# Patient Record
Sex: FEMALE | Race: BLACK OR AFRICAN AMERICAN | Employment: OTHER | URBAN - METROPOLITAN AREA
[De-identification: names, ages, dates, MRNs, and addresses within clinical notes are randomized per-mention and may not be internally consistent; named-entity substitution may affect disease eponyms.]

---

## 2018-01-19 PROBLEM — I21.4 NSTEMI (NON-ST ELEVATED MYOCARDIAL INFARCTION) (HCC): Status: ACTIVE | Noted: 2018-01-19

## 2018-04-20 PROBLEM — I24.9 ACUTE CORONARY SYNDROME (HCC): Status: ACTIVE | Noted: 2018-04-20

## 2018-09-10 PROBLEM — I20.8 ANGINAL CHEST PAIN AT REST (HCC): Status: ACTIVE | Noted: 2018-09-10

## 2018-09-10 PROBLEM — Z95.1 HX OF CABG: Status: ACTIVE | Noted: 2018-02-20

## 2018-09-10 PROBLEM — K92.1 MELENA: Status: ACTIVE | Noted: 2018-09-10

## 2018-09-10 PROBLEM — D64.9 ANEMIA: Status: ACTIVE | Noted: 2018-09-10

## 2018-09-10 PROBLEM — E11.42 TYPE 2 DIABETES MELLITUS WITH DIABETIC POLYNEUROPATHY, WITHOUT LONG-TERM CURRENT USE OF INSULIN (HCC): Status: ACTIVE | Noted: 2018-02-20

## 2018-09-10 PROBLEM — E11.65 HYPERGLYCEMIA DUE TO TYPE 2 DIABETES MELLITUS (HCC): Status: ACTIVE | Noted: 2018-09-10

## 2018-09-13 PROBLEM — J96.90 RESPIRATORY FAILURE (HCC): Status: ACTIVE | Noted: 2018-09-13

## 2018-09-13 PROBLEM — J18.9 HOSPITAL-ACQUIRED PNEUMONIA: Status: ACTIVE | Noted: 2018-09-13
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
## 2019-02-21 PROBLEM — Z86.79 HX OF CORONARY ARTERY DISEASE: Status: ACTIVE | Noted: 2019-02-21

## 2019-02-21 PROBLEM — I20.0 UNSTABLE ANGINA (HCC): Status: ACTIVE | Noted: 2019-02-21

## 2019-03-11 PROBLEM — R04.2 HEMOPTYSIS: Status: ACTIVE | Noted: 2019-03-11

## 2019-03-11 PROBLEM — J18.9 HCAP (HEALTHCARE-ASSOCIATED PNEUMONIA): Status: ACTIVE | Noted: 2019-03-11

## 2019-03-26 PROBLEM — I50.23 ACUTE ON CHRONIC SYSTOLIC HEART FAILURE (HCC): Status: ACTIVE | Noted: 2019-03-26

## 2019-08-20 ENCOUNTER — HOSPITAL ENCOUNTER (OUTPATIENT)
Age: 69
Setting detail: OBSERVATION
LOS: 1 days | Discharge: HOME HEALTH | End: 2019-08-21
Attending: EMERGENCY MEDICINE | Admitting: INTERNAL MEDICINE
Payer: MEDICARE

## 2019-08-20 ENCOUNTER — APPOINTMENT (OUTPATIENT)
Dept: GENERAL RADIOLOGY | Age: 69
End: 2019-08-20
Attending: EMERGENCY MEDICINE
Payer: MEDICARE

## 2019-08-20 DIAGNOSIS — R07.9 ACUTE CHEST PAIN: Primary | ICD-10-CM

## 2019-08-20 LAB
ALBUMIN SERPL-MCNC: 3.7 G/DL (ref 3.4–5)
ALBUMIN/GLOB SERPL: 1 {RATIO} (ref 0.8–1.7)
ALP SERPL-CCNC: 117 U/L (ref 45–117)
ALT SERPL-CCNC: 24 U/L (ref 13–56)
ANION GAP SERPL CALC-SCNC: 9 MMOL/L (ref 3–18)
AST SERPL-CCNC: 46 U/L (ref 10–38)
BASOPHILS # BLD: 0 K/UL (ref 0–0.1)
BASOPHILS NFR BLD: 0 % (ref 0–2)
BILIRUB SERPL-MCNC: 0.6 MG/DL (ref 0.2–1)
BNP SERPL-MCNC: 2113 PG/ML (ref 0–900)
BUN SERPL-MCNC: 14 MG/DL (ref 7–18)
BUN/CREAT SERPL: 13 (ref 12–20)
CALCIUM SERPL-MCNC: 9 MG/DL (ref 8.5–10.1)
CHLORIDE SERPL-SCNC: 105 MMOL/L (ref 100–111)
CO2 SERPL-SCNC: 27 MMOL/L (ref 21–32)
CREAT SERPL-MCNC: 1.04 MG/DL (ref 0.6–1.3)
DIFFERENTIAL METHOD BLD: ABNORMAL
EOSINOPHIL # BLD: 0.1 K/UL (ref 0–0.4)
EOSINOPHIL NFR BLD: 1 % (ref 0–5)
ERYTHROCYTE [DISTWIDTH] IN BLOOD BY AUTOMATED COUNT: 14.2 % (ref 11.6–14.5)
GLOBULIN SER CALC-MCNC: 3.6 G/DL (ref 2–4)
GLUCOSE SERPL-MCNC: 217 MG/DL (ref 74–99)
HCT VFR BLD AUTO: 38.7 % (ref 35–45)
HGB BLD-MCNC: 13 G/DL (ref 12–16)
LYMPHOCYTES # BLD: 1.4 K/UL (ref 0.9–3.6)
LYMPHOCYTES NFR BLD: 12 % (ref 21–52)
MCH RBC QN AUTO: 29.4 PG (ref 24–34)
MCHC RBC AUTO-ENTMCNC: 33.6 G/DL (ref 31–37)
MCV RBC AUTO: 87.6 FL (ref 74–97)
MONOCYTES # BLD: 0.4 K/UL (ref 0.05–1.2)
MONOCYTES NFR BLD: 4 % (ref 3–10)
NEUTS SEG # BLD: 9.2 K/UL (ref 1.8–8)
NEUTS SEG NFR BLD: 83 % (ref 40–73)
PLATELET # BLD AUTO: 218 K/UL (ref 135–420)
PMV BLD AUTO: 11 FL (ref 9.2–11.8)
POTASSIUM SERPL-SCNC: 5 MMOL/L (ref 3.5–5.5)
PROT SERPL-MCNC: 7.3 G/DL (ref 6.4–8.2)
RBC # BLD AUTO: 4.42 M/UL (ref 4.2–5.3)
SODIUM SERPL-SCNC: 141 MMOL/L (ref 136–145)
TROPONIN I SERPL-MCNC: <0.02 NG/ML (ref 0–0.04)
TROPONIN I SERPL-MCNC: <0.02 NG/ML (ref 0–0.04)
WBC # BLD AUTO: 11.1 K/UL (ref 4.6–13.2)

## 2019-08-20 PROCEDURE — 74011250637 HC RX REV CODE- 250/637: Performed by: EMERGENCY MEDICINE

## 2019-08-20 PROCEDURE — 74011250636 HC RX REV CODE- 250/636: Performed by: EMERGENCY MEDICINE

## 2019-08-20 PROCEDURE — 71046 X-RAY EXAM CHEST 2 VIEWS: CPT

## 2019-08-20 PROCEDURE — 85025 COMPLETE CBC W/AUTO DIFF WBC: CPT

## 2019-08-20 PROCEDURE — 80053 COMPREHEN METABOLIC PANEL: CPT

## 2019-08-20 PROCEDURE — 99218 HC RM OBSERVATION: CPT

## 2019-08-20 PROCEDURE — 93005 ELECTROCARDIOGRAM TRACING: CPT

## 2019-08-20 PROCEDURE — 84484 ASSAY OF TROPONIN QUANT: CPT

## 2019-08-20 PROCEDURE — 65270000029 HC RM PRIVATE

## 2019-08-20 PROCEDURE — 96374 THER/PROPH/DIAG INJ IV PUSH: CPT

## 2019-08-20 PROCEDURE — 83880 ASSAY OF NATRIURETIC PEPTIDE: CPT

## 2019-08-20 PROCEDURE — 99285 EMERGENCY DEPT VISIT HI MDM: CPT

## 2019-08-20 RX ORDER — LEVOTHYROXINE SODIUM 88 UG/1
88 TABLET ORAL
Status: DISCONTINUED | OUTPATIENT
Start: 2019-08-21 | End: 2019-08-21 | Stop reason: HOSPADM

## 2019-08-20 RX ORDER — POTASSIUM CHLORIDE 750 MG/1
10 TABLET, EXTENDED RELEASE ORAL DAILY
Status: DISCONTINUED | OUTPATIENT
Start: 2019-08-21 | End: 2019-08-21 | Stop reason: HOSPADM

## 2019-08-20 RX ORDER — FOLIC ACID 1 MG/1
1 TABLET ORAL DAILY
Status: DISCONTINUED | OUTPATIENT
Start: 2019-08-21 | End: 2019-08-21 | Stop reason: HOSPADM

## 2019-08-20 RX ORDER — DOCUSATE SODIUM 100 MG/1
100 CAPSULE, LIQUID FILLED ORAL
Status: DISCONTINUED | OUTPATIENT
Start: 2019-08-20 | End: 2019-08-21 | Stop reason: HOSPADM

## 2019-08-20 RX ORDER — FUROSEMIDE 40 MG/1
40 TABLET ORAL 2 TIMES DAILY
Status: DISCONTINUED | OUTPATIENT
Start: 2019-08-21 | End: 2019-08-21 | Stop reason: HOSPADM

## 2019-08-20 RX ORDER — LANOLIN ALCOHOL/MO/W.PET/CERES
325 CREAM (GRAM) TOPICAL
Status: DISCONTINUED | OUTPATIENT
Start: 2019-08-21 | End: 2019-08-21 | Stop reason: HOSPADM

## 2019-08-20 RX ORDER — GABAPENTIN 400 MG/1
400 CAPSULE ORAL 3 TIMES DAILY
Status: DISCONTINUED | OUTPATIENT
Start: 2019-08-21 | End: 2019-08-21 | Stop reason: HOSPADM

## 2019-08-20 RX ORDER — ISOSORBIDE MONONITRATE 60 MG/1
120 TABLET, EXTENDED RELEASE ORAL
Status: DISCONTINUED | OUTPATIENT
Start: 2019-08-21 | End: 2019-08-21 | Stop reason: HOSPADM

## 2019-08-20 RX ORDER — FUROSEMIDE 10 MG/ML
40 INJECTION INTRAMUSCULAR; INTRAVENOUS ONCE
Status: COMPLETED | OUTPATIENT
Start: 2019-08-20 | End: 2019-08-20

## 2019-08-20 RX ORDER — ATORVASTATIN CALCIUM 40 MG/1
80 TABLET, FILM COATED ORAL DAILY
Status: DISCONTINUED | OUTPATIENT
Start: 2019-08-21 | End: 2019-08-21 | Stop reason: HOSPADM

## 2019-08-20 RX ORDER — GUAIFENESIN 100 MG/5ML
81 LIQUID (ML) ORAL DAILY
Status: DISCONTINUED | OUTPATIENT
Start: 2019-08-21 | End: 2019-08-21 | Stop reason: HOSPADM

## 2019-08-20 RX ORDER — NALOXONE HYDROCHLORIDE 0.4 MG/ML
0.4 INJECTION, SOLUTION INTRAMUSCULAR; INTRAVENOUS; SUBCUTANEOUS AS NEEDED
Status: DISCONTINUED | OUTPATIENT
Start: 2019-08-20 | End: 2019-08-21 | Stop reason: HOSPADM

## 2019-08-20 RX ORDER — RANOLAZINE 500 MG/1
1000 TABLET, EXTENDED RELEASE ORAL EVERY 12 HOURS
Status: DISCONTINUED | OUTPATIENT
Start: 2019-08-21 | End: 2019-08-21 | Stop reason: HOSPADM

## 2019-08-20 RX ORDER — PANTOPRAZOLE SODIUM 40 MG/1
40 TABLET, DELAYED RELEASE ORAL
Status: DISCONTINUED | OUTPATIENT
Start: 2019-08-21 | End: 2019-08-21 | Stop reason: HOSPADM

## 2019-08-20 RX ORDER — MAGNESIUM SULFATE 100 %
4 CRYSTALS MISCELLANEOUS AS NEEDED
Status: DISCONTINUED | OUTPATIENT
Start: 2019-08-20 | End: 2019-08-21 | Stop reason: HOSPADM

## 2019-08-20 RX ORDER — NITROGLYCERIN 0.4 MG/1
0.4 TABLET SUBLINGUAL AS NEEDED
Status: DISCONTINUED | OUTPATIENT
Start: 2019-08-20 | End: 2019-08-21 | Stop reason: HOSPADM

## 2019-08-20 RX ORDER — GUAIFENESIN 100 MG/5ML
243 LIQUID (ML) ORAL
Status: COMPLETED | OUTPATIENT
Start: 2019-08-20 | End: 2019-08-20

## 2019-08-20 RX ORDER — GUAIFENESIN 100 MG/5ML
100 SOLUTION ORAL
Status: DISCONTINUED | OUTPATIENT
Start: 2019-08-20 | End: 2019-08-21 | Stop reason: HOSPADM

## 2019-08-20 RX ORDER — ONDANSETRON 2 MG/ML
4 INJECTION INTRAMUSCULAR; INTRAVENOUS
Status: DISCONTINUED | OUTPATIENT
Start: 2019-08-20 | End: 2019-08-21 | Stop reason: HOSPADM

## 2019-08-20 RX ORDER — ALLOPURINOL 100 MG/1
100 TABLET ORAL DAILY
Status: DISCONTINUED | OUTPATIENT
Start: 2019-08-21 | End: 2019-08-21 | Stop reason: HOSPADM

## 2019-08-20 RX ORDER — IPRATROPIUM BROMIDE AND ALBUTEROL SULFATE 2.5; .5 MG/3ML; MG/3ML
3 SOLUTION RESPIRATORY (INHALATION)
Status: DISCONTINUED | OUTPATIENT
Start: 2019-08-20 | End: 2019-08-21 | Stop reason: HOSPADM

## 2019-08-20 RX ORDER — FLUTICASONE FUROATE AND VILANTEROL 100; 25 UG/1; UG/1
1 POWDER RESPIRATORY (INHALATION)
Status: DISCONTINUED | OUTPATIENT
Start: 2019-08-21 | End: 2019-08-21 | Stop reason: HOSPADM

## 2019-08-20 RX ORDER — INSULIN LISPRO 100 [IU]/ML
INJECTION, SOLUTION INTRAVENOUS; SUBCUTANEOUS EVERY 6 HOURS
Status: DISCONTINUED | OUTPATIENT
Start: 2019-08-21 | End: 2019-08-21 | Stop reason: HOSPADM

## 2019-08-20 RX ORDER — DULOXETIN HYDROCHLORIDE 60 MG/1
60 CAPSULE, DELAYED RELEASE ORAL DAILY
Status: DISCONTINUED | OUTPATIENT
Start: 2019-08-21 | End: 2019-08-21 | Stop reason: HOSPADM

## 2019-08-20 RX ORDER — ACETAMINOPHEN 500 MG
500 TABLET ORAL
Status: COMPLETED | OUTPATIENT
Start: 2019-08-20 | End: 2019-08-20

## 2019-08-20 RX ORDER — CARVEDILOL 6.25 MG/1
6.25 TABLET ORAL 2 TIMES DAILY WITH MEALS
Status: DISCONTINUED | OUTPATIENT
Start: 2019-08-21 | End: 2019-08-21 | Stop reason: HOSPADM

## 2019-08-20 RX ORDER — DEXTROSE MONOHYDRATE 100 MG/ML
125-250 INJECTION, SOLUTION INTRAVENOUS AS NEEDED
Status: DISCONTINUED | OUTPATIENT
Start: 2019-08-20 | End: 2019-08-21 | Stop reason: HOSPADM

## 2019-08-20 RX ORDER — LOSARTAN POTASSIUM 50 MG/1
50 TABLET ORAL DAILY
Status: DISCONTINUED | OUTPATIENT
Start: 2019-08-21 | End: 2019-08-21 | Stop reason: HOSPADM

## 2019-08-20 RX ORDER — CLOPIDOGREL BISULFATE 75 MG/1
75 TABLET ORAL DAILY
Status: DISCONTINUED | OUTPATIENT
Start: 2019-08-21 | End: 2019-08-21 | Stop reason: HOSPADM

## 2019-08-20 RX ADMIN — ACETAMINOPHEN 500 MG: 500 TABLET ORAL at 20:12

## 2019-08-20 RX ADMIN — FUROSEMIDE 40 MG: 10 INJECTION, SOLUTION INTRAVENOUS at 21:57

## 2019-08-20 RX ADMIN — ASPIRIN 81 MG 243 MG: 81 TABLET ORAL at 19:06

## 2019-08-20 NOTE — ED PROVIDER NOTES
EMERGENCY DEPARTMENT HISTORY AND PHYSICAL EXAM    6:24 PM  Date: 8/20/2019  Patient Name: Wisam De Leon    History of Presenting Illness     Chief Complaint   Patient presents with    Chest Pain       History Provided By: patient    HPI: Wisam De Leon is a 79 y.o. female with CAD, multivessel stents, hypertension, diabetes, anemia presents with chest pain that started this morning. Pain severity is 4 out of 10, radiating to her left arm and left side of her face.   Pain feels achy and pressure, no nausea no vomiting no diaphoresis no difficulty breathing    Location: L Sided  Severity:4/10  Timing/course:intermittent  Onset/Duration:24 hrs     PCP: Ciarra Pappas MD    Past History     Past Medical History:  Past Medical History:   Diagnosis Date    Adenomatous polyp of colon 05/18/12    Alopecia, unspecified 02/14/08    Anemia     Asthma     Asthma     Blood transfusion abn reaction or complication, no procedure mishap     Cataracts, bilateral     Chest pain     Cholecystitis     Chronic back pain     Colorectal polyps     Constipation     Coronary stent occlusion 05/26/10    Cystic kidney disease 2008    Depression     Diabetes mellitus     Diabetes mellitus ([address]     Diarrhea     Emphysema     Exposure to communicable disease     Gastrointestinal ulcer     Gout     H/O seasonal allergies     Headache(784.0)     Heart disease     Heart murmur     Heartburn     History of unstable angina 02/17/12    HSV (herpes simplex virus) infection 2010    Hypercholesteremia     Hypertension     Hypokalemia 04/04/12    Kidney stone     Muscle atrophy     OA (osteoarthritis of spine)     Phlebitis     Pregnancy     Pulmonary hypertension (HCC)     Reflux     Renal artery stenosis (HCC)     Renal insufficiency     Rheumatic fever     Rheumatoid arthritis(714.0)     Sleep apnea 2007    Stomach ulcer     Systemic lupus erythematosus (HCC)     Thyroid dysfunction     Tuberculosis of lung     Unspecified constipation 02/14/08    Unspecified hypothyroidism 07/01/10       Past Surgical History:  Past Surgical History:   Procedure Laterality Date    CARDIAC CATHETERIZATION  1/19/2018         HX CHOLECYSTECTOMY      HX CORONARY ARTERY BYPASS GRAFT      HX CORONARY STENT PLACEMENT  2009    HX CORONARY STENT PLACEMENT  5/26/10    HX DILATION AND CURETTAGE  2008    HX ENDOSCOPY      HX HEART CATHETERIZATION  5/26/10    stent July 2019    HX HERNIA REPAIR      HX TONSIL AND ADENOIDECTOMY         Family History:  Family History   Problem Relation Age of Onset    Ovarian Cancer Sister        Social History:  Social History     Tobacco Use    Smoking status: Former Smoker    Smokeless tobacco: Never Used   Substance Use Topics    Alcohol use: No    Drug use: No       Allergies: Allergies   Allergen Reactions    Latex Other (comments)    Propoxyphene N-Acetaminophen Other (comments)    Seafood [Iodine And Iodide Containing Products] Hives and Swelling    Shellfish Derived Other (comments)    Ace Inhibitors Other (comments)     Lotensin    Adhesive Other (comments)    Dexamethasone Diarrhea and Nausea and Vomiting    Iodine Other (comments)    Levofloxacin Hives, Itching and Swelling     Trouble breathing    Metronidazole Other (comments)    Niaspan [Niacin] Other (comments)     flushing    Reglan [Metoclopramide] Other (comments)       Review of Systems   Review of Systems   Constitutional: Negative for activity change, appetite change and chills. HENT: Negative for congestion, ear discharge, ear pain and sore throat. Eyes: Negative for photophobia and pain. Respiratory: Negative for cough and choking. Cardiovascular: Positive for chest pain. Negative for palpitations and leg swelling. Gastrointestinal: Negative for anal bleeding and rectal pain. Endocrine: Negative for polydipsia and polyuria.    Genitourinary: Negative for genital sores and urgency. Musculoskeletal: Negative for arthralgias and myalgias. Neurological: Negative for dizziness, seizures and speech difficulty. Psychiatric/Behavioral: Negative for hallucinations, self-injury and suicidal ideas. Physical Exam     Patient Vitals for the past 12 hrs:   Pulse Resp BP SpO2   08/20/19 1800 69 16 111/70 100 %       Physical Exam   Constitutional: She is oriented to person, place, and time. She appears well-developed and well-nourished. HENT:   Head: Normocephalic and atraumatic. Eyes: Right eye exhibits no discharge. Left eye exhibits no discharge. Neck: Normal range of motion. Neck supple. Cardiovascular: Normal rate, regular rhythm, normal heart sounds and intact distal pulses. No murmur heard. Pulmonary/Chest: Effort normal and breath sounds normal. No stridor. No respiratory distress. She has no wheezes. She has no rales. She exhibits no tenderness. Abdominal: Soft. Bowel sounds are normal. She exhibits no distension. There is no tenderness. There is no rebound and no guarding. Genitourinary: Rectal exam shows guaiac negative stool. No penile tenderness. Musculoskeletal: Normal range of motion. Neurological: She is alert and oriented to person, place, and time. She has normal reflexes. Skin: Skin is warm and dry. Psychiatric: She has a normal mood and affect. Nursing note and vitals reviewed.       Diagnostic Study Results     Labs -  Recent Results (from the past 12 hour(s))   EKG, 12 LEAD, INITIAL    Collection Time: 08/20/19  6:01 PM   Result Value Ref Range    Ventricular Rate 66 BPM    Atrial Rate 66 BPM    P-R Interval 188 ms    QRS Duration 88 ms    Q-T Interval 422 ms    QTC Calculation (Bezet) 442 ms    Calculated P Axis 72 degrees    Calculated R Axis -36 degrees    Calculated T Axis 148 degrees    Diagnosis       Normal sinus rhythm  Left axis deviation  Left ventricular hypertrophy with repolarization abnormality  Abnormal ECG  No previous ECGs available     CBC WITH AUTOMATED DIFF    Collection Time: 08/20/19  6:40 PM   Result Value Ref Range    WBC 11.1 4.6 - 13.2 K/uL    RBC 4.42 4.20 - 5.30 M/uL    HGB 13.0 12.0 - 16.0 g/dL    HCT 38.7 35.0 - 45.0 %    MCV 87.6 74.0 - 97.0 FL    MCH 29.4 24.0 - 34.0 PG    MCHC 33.6 31.0 - 37.0 g/dL    RDW 14.2 11.6 - 14.5 %    PLATELET 910 805 - 701 K/uL    MPV 11.0 9.2 - 11.8 FL    NEUTROPHILS 83 (H) 40 - 73 %    LYMPHOCYTES 12 (L) 21 - 52 %    MONOCYTES 4 3 - 10 %    EOSINOPHILS 1 0 - 5 %    BASOPHILS 0 0 - 2 %    ABS. NEUTROPHILS 9.2 (H) 1.8 - 8.0 K/UL    ABS. LYMPHOCYTES 1.4 0.9 - 3.6 K/UL    ABS. MONOCYTES 0.4 0.05 - 1.2 K/UL    ABS. EOSINOPHILS 0.1 0.0 - 0.4 K/UL    ABS. BASOPHILS 0.0 0.0 - 0.1 K/UL    DF AUTOMATED     METABOLIC PANEL, COMPREHENSIVE    Collection Time: 08/20/19  6:40 PM   Result Value Ref Range    Sodium 141 136 - 145 mmol/L    Potassium 5.0 3.5 - 5.5 mmol/L    Chloride 105 100 - 111 mmol/L    CO2 27 21 - 32 mmol/L    Anion gap 9 3.0 - 18 mmol/L    Glucose 217 (H) 74 - 99 mg/dL    BUN 14 7.0 - 18 MG/DL    Creatinine 1.04 0.6 - 1.3 MG/DL    BUN/Creatinine ratio 13 12 - 20      GFR est AA >60 >60 ml/min/1.73m2    GFR est non-AA 53 (L) >60 ml/min/1.73m2    Calcium 9.0 8.5 - 10.1 MG/DL    Bilirubin, total 0.6 0.2 - 1.0 MG/DL    ALT (SGPT) 24 13 - 56 U/L    AST (SGOT) 46 (H) 10 - 38 U/L    Alk. phosphatase 117 45 - 117 U/L    Protein, total 7.3 6.4 - 8.2 g/dL    Albumin 3.7 3.4 - 5.0 g/dL    Globulin 3.6 2.0 - 4.0 g/dL    A-G Ratio 1.0 0.8 - 1.7     TROPONIN I    Collection Time: 08/20/19  6:40 PM   Result Value Ref Range    Troponin-I, QT <0.02 0.0 - 0.045 NG/ML   NT-PRO BNP    Collection Time: 08/20/19  6:40 PM   Result Value Ref Range    NT pro-BNP 2,113 (H) 0 - 900 PG/ML       Radiologic Studies -   Xr Chest Pa Lat    Result Date: 8/20/2019  IMPRESSION: Negative chest.        Medical Decision Making     ED Course: Progress Notes, Reevaluation, and Consults:    6:24 PM Initial assessment performed. The patients presenting problems have been discussed, and they/their family are in agreement with the care plan formulated and outlined with them. I have encouraged them to ask questions as they arise throughout their visit. Provider Notes (Medical Decision Making):      Patient with multiple risk factors, heart score: 6 , patient given aspirin. Pain has now subsided but still feels dull ache on left arm. Presents with chest pain-suspicious story  Spoke to [facility] who said that Dr. aCri Dunbar will f/u patient  EKG with ST wave inversions in inferior leads- not present in March 2019  Fist troponin negative  Patient will be admitted to telemetry for ACS r/o  Patient initially wanted to be transferred at [place]- transfer center called, no beds there. Had lengthy discussion with family they decided they prefer to be admitted to our hospital.    Vital Signs-Reviewed the patient's vital signs. Reviewed pt's pulse ox reading. EKG: Interpreted by the EP. Time Interpreted: 6:03 PM   Rate: 66   Rhythm: NSR   Interpretation:T wave in version in lateral leads, new   Comparison: 3/26/19    Records Reviewed: Old Medical Records (Time of Review: 6:24 PM)  -I am the first provider for this patient.  -I reviewed the vital signs, available nursing notes, past medical history, past surgical history, family history and social history. Current Outpatient Medications   Medication Sig Dispense Refill    nystatin (MYCOSTATIN) powder Apply  to affected area two (2) times a day. 60 g 2    furosemide (LASIX) 40 mg tablet Take 1 Tab by mouth two (2) times a day. At 8am and 2pm 30 Tab 1    DULoxetine (CYMBALTA) 60 mg capsule Take 1 Cap by mouth daily. 30 Cap 0    carvedilol (COREG) 6.25 mg tablet Take 1 Tab by mouth two (2) times daily (with meals). 60 Tab 0    famotidine (PEPCID) 40 mg tablet Take 1 Tab by mouth daily.  30 Tab 0    fluticasone furoate-vilanterol (BREO ELLIPTA) 100-25 mcg/dose inhaler Take 1 Puff by inhalation daily. 1 Inhaler 0    guaiFENesin (ROBITUSSIN) 100 mg/5 mL liquid Take 5 mL by mouth every four (4) hours as needed for Cough. 118 mL 0    insulin NPH/insulin regular (HUMULIN 70/30 U-100 INSULIN) 100 unit/mL (70-30) injection 10-15 Units by SubCUTAneous route two (2) times a day.  ranolazine ER (RANEXA) 1,000 mg Take 1 Tab by mouth two (2) times a day. 60 Tab 1    potassium chloride SR (KLOR-CON 10) 10 mEq tablet Take 10 mEq by mouth daily.  glucose blood VI test strips (ASCENSIA AUTODISC VI, ONE TOUCH ULTRA TEST VI) strip by Does Not Apply route See Admin Instructions.  ferrous sulfate 325 mg (65 mg iron) tablet Take 325 mg by mouth Daily (before breakfast).  ascorbic acid, vitamin C, (VITAMIN C) 500 mg tablet Take 500 mg by mouth daily.  levothyroxine (SYNTHROID) 88 mcg tablet Take 88 mcg by mouth Daily (before breakfast).  losartan (COZAAR) 50 mg tablet Take 1 Tab by mouth daily. 30 Tab 1    clopidogrel (PLAVIX) 75 mg tab Take 75 mg by mouth daily.  gabapentin (NEURONTIN) 600 mg tablet Take 600 mg by mouth three (3) times daily.  nitroglycerin (NITROSTAT) 0.4 mg SL tablet 1 Tab by SubLINGual route as needed for Chest Pain (x3). 1 Bottle 1    allopurinol (ZYLOPRIM) 100 mg tablet Take 100 mg by mouth daily.  glimepiride (AMARYL) 4 mg tablet Take  by mouth two (2) times a day.  aspirin 81 mg tablet Take 81 mg by mouth daily.  atorvastatin (LIPITOR) 80 mg tablet Take 80 mg by mouth daily.  folic acid (FOLVITE) 1 mg tablet Take 1 mg by mouth daily.  isosorbide mononitrate ER (IMDUR) 120 mg CR tablet Take 120 mg by mouth every morning.  albuterol (PROVENTIL HFA, VENTOLIN HFA, PROAIR HFA) 90 mcg/actuation inhaler Take 2 Puffs by inhalation every four (4) hours as needed for Wheezing. 1 Inhaler 0    lactobacillus acidophilus (BACID) cap Take 2 Caps by mouth daily. Clinical Impression     Clinical Impression:   1.  Acute chest pain Disposition: Christ Treadwell MD  6:24 PM

## 2019-08-21 VITALS
RESPIRATION RATE: 20 BRPM | BODY MASS INDEX: 31.41 KG/M2 | DIASTOLIC BLOOD PRESSURE: 70 MMHG | HEART RATE: 83 BPM | OXYGEN SATURATION: 98 % | HEIGHT: 60 IN | WEIGHT: 160 LBS | TEMPERATURE: 98.8 F | SYSTOLIC BLOOD PRESSURE: 104 MMHG

## 2019-08-21 LAB
ANION GAP SERPL CALC-SCNC: 5 MMOL/L (ref 3–18)
BASOPHILS # BLD: 0 K/UL (ref 0–0.1)
BASOPHILS NFR BLD: 0 % (ref 0–2)
BUN SERPL-MCNC: 15 MG/DL (ref 7–18)
BUN/CREAT SERPL: 14 (ref 12–20)
CALCIUM SERPL-MCNC: 8.9 MG/DL (ref 8.5–10.1)
CHLORIDE SERPL-SCNC: 103 MMOL/L (ref 100–111)
CK MB CFR SERPL CALC: 1.7 % (ref 0–4)
CK MB SERPL-MCNC: 1.6 NG/ML (ref 5–25)
CK SERPL-CCNC: 93 U/L (ref 26–192)
CO2 SERPL-SCNC: 31 MMOL/L (ref 21–32)
CREAT SERPL-MCNC: 1.1 MG/DL (ref 0.6–1.3)
DIFFERENTIAL METHOD BLD: NORMAL
EOSINOPHIL # BLD: 0.1 K/UL (ref 0–0.4)
EOSINOPHIL NFR BLD: 1 % (ref 0–5)
ERYTHROCYTE [DISTWIDTH] IN BLOOD BY AUTOMATED COUNT: 14.4 % (ref 11.6–14.5)
GLUCOSE BLD STRIP.AUTO-MCNC: 119 MG/DL (ref 70–110)
GLUCOSE BLD STRIP.AUTO-MCNC: 138 MG/DL (ref 70–110)
GLUCOSE BLD STRIP.AUTO-MCNC: 185 MG/DL (ref 70–110)
GLUCOSE BLD STRIP.AUTO-MCNC: 214 MG/DL (ref 70–110)
GLUCOSE BLD STRIP.AUTO-MCNC: 251 MG/DL (ref 70–110)
GLUCOSE SERPL-MCNC: 108 MG/DL (ref 74–99)
HCT VFR BLD AUTO: 39.9 % (ref 35–45)
HGB BLD-MCNC: 13.3 G/DL (ref 12–16)
LYMPHOCYTES # BLD: 2.1 K/UL (ref 0.9–3.6)
LYMPHOCYTES NFR BLD: 24 % (ref 21–52)
MAGNESIUM SERPL-MCNC: 2 MG/DL (ref 1.6–2.6)
MCH RBC QN AUTO: 29.4 PG (ref 24–34)
MCHC RBC AUTO-ENTMCNC: 33.3 G/DL (ref 31–37)
MCV RBC AUTO: 88.3 FL (ref 74–97)
MONOCYTES # BLD: 0.6 K/UL (ref 0.05–1.2)
MONOCYTES NFR BLD: 7 % (ref 3–10)
NEUTS SEG # BLD: 5.9 K/UL (ref 1.8–8)
NEUTS SEG NFR BLD: 68 % (ref 40–73)
PHOSPHATE SERPL-MCNC: 2.8 MG/DL (ref 2.5–4.9)
PLATELET # BLD AUTO: 230 K/UL (ref 135–420)
PMV BLD AUTO: 11.4 FL (ref 9.2–11.8)
POTASSIUM SERPL-SCNC: 3.5 MMOL/L (ref 3.5–5.5)
RBC # BLD AUTO: 4.52 M/UL (ref 4.2–5.3)
SODIUM SERPL-SCNC: 139 MMOL/L (ref 136–145)
TROPONIN I SERPL-MCNC: <0.02 NG/ML (ref 0–0.04)
WBC # BLD AUTO: 8.8 K/UL (ref 4.6–13.2)

## 2019-08-21 PROCEDURE — 82550 ASSAY OF CK (CPK): CPT

## 2019-08-21 PROCEDURE — 36415 COLL VENOUS BLD VENIPUNCTURE: CPT

## 2019-08-21 PROCEDURE — 74011000250 HC RX REV CODE- 250: Performed by: STUDENT IN AN ORGANIZED HEALTH CARE EDUCATION/TRAINING PROGRAM

## 2019-08-21 PROCEDURE — 74011250636 HC RX REV CODE- 250/636: Performed by: INTERNAL MEDICINE

## 2019-08-21 PROCEDURE — 74011250637 HC RX REV CODE- 250/637: Performed by: INTERNAL MEDICINE

## 2019-08-21 PROCEDURE — 82962 GLUCOSE BLOOD TEST: CPT

## 2019-08-21 PROCEDURE — 99218 HC RM OBSERVATION: CPT

## 2019-08-21 PROCEDURE — 80048 BASIC METABOLIC PNL TOTAL CA: CPT

## 2019-08-21 PROCEDURE — 83735 ASSAY OF MAGNESIUM: CPT

## 2019-08-21 PROCEDURE — 74011636637 HC RX REV CODE- 636/637: Performed by: INTERNAL MEDICINE

## 2019-08-21 PROCEDURE — 93005 ELECTROCARDIOGRAM TRACING: CPT

## 2019-08-21 PROCEDURE — 84100 ASSAY OF PHOSPHORUS: CPT

## 2019-08-21 PROCEDURE — 94640 AIRWAY INHALATION TREATMENT: CPT

## 2019-08-21 PROCEDURE — 96375 TX/PRO/DX INJ NEW DRUG ADDON: CPT

## 2019-08-21 PROCEDURE — 85025 COMPLETE CBC W/AUTO DIFF WBC: CPT

## 2019-08-21 RX ORDER — LIDOCAINE HYDROCHLORIDE 20 MG/ML
15 SOLUTION OROPHARYNGEAL
Status: DISCONTINUED | OUTPATIENT
Start: 2019-08-21 | End: 2019-08-21 | Stop reason: HOSPADM

## 2019-08-21 RX ORDER — FLUCONAZOLE 150 MG/1
150 TABLET ORAL DAILY
Qty: 2 TAB | Refills: 0 | Status: SHIPPED | OUTPATIENT
Start: 2019-08-21 | End: 2019-08-23

## 2019-08-21 RX ORDER — KETOROLAC TROMETHAMINE 15 MG/ML
15 INJECTION, SOLUTION INTRAMUSCULAR; INTRAVENOUS ONCE
Status: COMPLETED | OUTPATIENT
Start: 2019-08-21 | End: 2019-08-21

## 2019-08-21 RX ORDER — KETOROLAC TROMETHAMINE 15 MG/ML
INJECTION, SOLUTION INTRAMUSCULAR; INTRAVENOUS
Status: DISPENSED
Start: 2019-08-21 | End: 2019-08-21

## 2019-08-21 RX ORDER — LIDOCAINE HYDROCHLORIDE 20 MG/ML
15 SOLUTION OROPHARYNGEAL
Qty: 1 BOTTLE | Refills: 0 | Status: SHIPPED | OUTPATIENT
Start: 2019-08-21 | End: 2019-09-20

## 2019-08-21 RX ORDER — AMOXICILLIN AND CLAVULANATE POTASSIUM 875; 125 MG/1; MG/1
1 TABLET, FILM COATED ORAL 2 TIMES DAILY
Qty: 10 TAB | Refills: 0 | Status: SHIPPED | OUTPATIENT
Start: 2019-08-21 | End: 2019-08-26

## 2019-08-21 RX ORDER — RANOLAZINE 1000 MG/1
1000 TABLET, EXTENDED RELEASE ORAL 2 TIMES DAILY
Qty: 60 TAB | Refills: 1 | Status: SHIPPED | OUTPATIENT
Start: 2019-08-21 | End: 2019-09-20

## 2019-08-21 RX ADMIN — ISOSORBIDE MONONITRATE 120 MG: 60 TABLET, EXTENDED RELEASE ORAL at 09:10

## 2019-08-21 RX ADMIN — FLUTICASONE FUROATE AND VILANTEROL TRIFENATATE 1 PUFF: 100; 25 POWDER RESPIRATORY (INHALATION) at 07:23

## 2019-08-21 RX ADMIN — PANTOPRAZOLE SODIUM 40 MG: 40 TABLET, DELAYED RELEASE ORAL at 09:10

## 2019-08-21 RX ADMIN — LOSARTAN POTASSIUM 50 MG: 50 TABLET ORAL at 09:11

## 2019-08-21 RX ADMIN — ATORVASTATIN CALCIUM 80 MG: 40 TABLET, FILM COATED ORAL at 09:10

## 2019-08-21 RX ADMIN — INSULIN LISPRO 4 UNITS: 100 INJECTION, SOLUTION INTRAVENOUS; SUBCUTANEOUS at 01:01

## 2019-08-21 RX ADMIN — CARVEDILOL 6.25 MG: 6.25 TABLET, FILM COATED ORAL at 09:11

## 2019-08-21 RX ADMIN — APIXABAN 5 MG: 5 TABLET, FILM COATED ORAL at 00:58

## 2019-08-21 RX ADMIN — ALLOPURINOL 100 MG: 100 TABLET ORAL at 09:11

## 2019-08-21 RX ADMIN — CLOPIDOGREL BISULFATE 75 MG: 75 TABLET ORAL at 09:11

## 2019-08-21 RX ADMIN — FERROUS SULFATE TAB 325 MG (65 MG ELEMENTAL FE) 325 MG: 325 (65 FE) TAB at 09:11

## 2019-08-21 RX ADMIN — FOLIC ACID 1 MG: 1 TABLET ORAL at 09:11

## 2019-08-21 RX ADMIN — CARVEDILOL 6.25 MG: 6.25 TABLET, FILM COATED ORAL at 17:00

## 2019-08-21 RX ADMIN — LEVOTHYROXINE SODIUM 88 MCG: 88 TABLET ORAL at 05:52

## 2019-08-21 RX ADMIN — INSULIN LISPRO 6 UNITS: 100 INJECTION, SOLUTION INTRAVENOUS; SUBCUTANEOUS at 12:25

## 2019-08-21 RX ADMIN — FUROSEMIDE 40 MG: 40 TABLET ORAL at 09:10

## 2019-08-21 RX ADMIN — ASPIRIN 81 MG 81 MG: 81 TABLET ORAL at 09:10

## 2019-08-21 RX ADMIN — DULOXETINE HYDROCHLORIDE 60 MG: 60 CAPSULE, DELAYED RELEASE ORAL at 09:10

## 2019-08-21 RX ADMIN — GABAPENTIN 400 MG: 400 CAPSULE ORAL at 17:00

## 2019-08-21 RX ADMIN — POTASSIUM CHLORIDE 10 MEQ: 10 TABLET, EXTENDED RELEASE ORAL at 09:10

## 2019-08-21 RX ADMIN — LIDOCAINE HYDROCHLORIDE 15 ML: 20 SOLUTION ORAL; TOPICAL at 12:25

## 2019-08-21 RX ADMIN — FUROSEMIDE 40 MG: 40 TABLET ORAL at 17:00

## 2019-08-21 RX ADMIN — RANOLAZINE 1000 MG: 500 TABLET, FILM COATED, EXTENDED RELEASE ORAL at 09:10

## 2019-08-21 RX ADMIN — RANOLAZINE 1000 MG: 500 TABLET, FILM COATED, EXTENDED RELEASE ORAL at 00:58

## 2019-08-21 RX ADMIN — INSULIN LISPRO 2 UNITS: 100 INJECTION, SOLUTION INTRAVENOUS; SUBCUTANEOUS at 17:00

## 2019-08-21 RX ADMIN — GABAPENTIN 400 MG: 400 CAPSULE ORAL at 09:11

## 2019-08-21 RX ADMIN — APIXABAN 5 MG: 5 TABLET, FILM COATED ORAL at 09:10

## 2019-08-21 RX ADMIN — KETOROLAC TROMETHAMINE 15 MG: 15 INJECTION, SOLUTION INTRAMUSCULAR; INTRAVENOUS at 00:58

## 2019-08-21 NOTE — PROGRESS NOTES
conducted an initial consultation and Spiritual Assessment for Darwin Lyle, who is a 79 y.o.,female. Patients Primary Language is: [place].    According to the patients EMR Latter-day Affiliation is: Jehovah's witness.     The reason the Patient came to the hospital is:   Patient Active Problem List    Diagnosis Date Noted    Acute on chronic systolic heart failure ([address] 03/26/2019    Hemoptysis 03/11/2019    HCAP (healthcare-associated pneumonia) 03/11/2019    Hx of coronary artery disease 02/21/2019    Unstable angina ([address] 02/21/2019    Respiratory failure ([address] 09/13/2018    Hospital-acquired pneumonia 09/13/2018    Anemia 09/10/2018    Anginal chest pain at rest ([address] 09/10/2018    Hyperglycemia due to type 2 diabetes mellitus ([address] 09/10/2018    Melena 09/10/2018    Acute coronary syndrome ([address] 04/20/2018    Hx of CABG 02/20/2018    Type 2 diabetes mellitus with diabetic polyneuropathy, without long-term current use of insulin ([address] 02/20/2018    NSTEMI (non-ST elevated myocardial infarction) ([address] 01/19/2018    Chronic ischemic heart disease 01/15/2015    Acquired trigger finger 03/12/2013    Coronary atherosclerosis 11/16/2012    CAD (coronary artery disease) 11/16/2012    Iron deficiency anemia 04/04/2012    Hypercholesterolemia 04/04/2012    Chest pain 02/17/2012    Intermediate coronary syndrome ([address] 02/24/2011    Acquired hypothyroidism 07/01/2010    Postsurgical percutaneous transluminal coronary angioplasty status 05/26/2010    Allergic rhinitis due to pollen 02/14/2008    Alopecia 02/14/2008    Esophageal reflux 02/14/2008    Essential hypertension 02/14/2008    Extrinsic asthma with exacerbation 02/14/2008    Mixed hyperlipidemia 02/14/2008    Other symptoms involving head and neck(784.99) 02/14/2008    Thoracic or lumbosacral neuritis or radiculitis 02/14/2008    Type II or unspecified type diabetes mellitus with neurological manifestations, not stated as uncontrolled(250.60) ([facility] 02/14/2008    Constipation 02/14/2008    Genital prolapse 02/14/2008        The  provided the following Interventions:  Initiated a relationship of care and support. Listened empathically. Provided information about Spiritual Care Services. Offered prayer and assurance of continued prayers on patient's behalf. Chart reviewed. The following outcomes where achieved:  Patient expressed gratitude for 's visit. Assessment:  Patient does not have any Muslim/cultural needs that will affect patients preferences in health care. There are no spiritual or Muslim issues which require intervention at this time. Plan:  Chaplains will continue to follow and will provide pastoral care on an as needed/requested basis.  recommends bedside caregivers page  on duty if patient shows signs of acute spiritual or emotional distress.     [id number] [facility]   [phone]

## 2019-08-21 NOTE — DIABETES MGMT
Diabetes Patient/Family Education Record  Factors That  May Influence Patients Ability  to Learn or  Comply with Recommendations   []   Language barrier    []   Cultural needs   []   Motivation    []   Cognitive limitation    []   Physical   [x]   Education    []   Physiological factors   []   Hearing/vision/speaking impairment   []   Roman Catholic beliefs    []   Financial factors   []  Other:   []  No factors identified at this time. Person Instructed:   [x]   Patient   []   Family   []  Other     Preference for Learning:   [x]   Verbal   []   Written   []  Demonstration     Level of Comprehension & Competence:    [x]  Good                                      [] Fair                                     []  Poor                             []  Needs Reinforcement   [x]  Teachback completed    Education Component:   [x]  Medication management, including how to administer insulin (if appropriate) and potential medication interactions   · Pt states she takes insulin 70/30 15 units at night and amaryl 4 mg daily. []  Nutritional management -obtain usual meal pattern   []  Exercise   [x]  Signs, symptoms, and treatment of hyperglycemia and hypoglycemia  Patient states she is having a lot of low blood sugars in the middle of the night. She states she has been taking her 70/30 insulin before bed. Encouraged pt to take her insulin with dinner to help prevent hypoglycemia. Keeps some type of sugar source at her bedside just in case. She is able to recognize the symptoms of low BG and knows how to treat.    [x] Prevention, recognition and treatment of hyperglycemia and hypoglycemia   [x]  Importance of blood glucose monitoring and how to obtain a blood glucose meter    []  Instruction on use of the blood glucose meter   [x]  Discuss the importance of HbA1C monitoring    [x]  Sick day guidelines   [x]  Proper use and disposal of lancets, needles, syringes or insulin pens (if appropriate)   [x] Potential long-term complications (retinopathy, kidney disease, neuropathy, foot care)   [x] Information about whom to contact in case of emergency or for more information    [x]  Goal:  Patient/family will demonstrate understanding of Diabetes Self Management Skills by: (date) _______  Plan for post-discharge education or self-management support:    [x] Outpatient class schedule provided            [] Patient Declined    [] Scheduled for outpatient classes (date) _______  Verify:  Does patient understand how diabetes medications work? Yes________________________  Does patient know what their most recent A1c is? Yes___________________________  Does patient monitor glucose at home? Yes. Does patient have difficulty obtaining diabetes medications or testing supplies? No issues voiced.

## 2019-08-21 NOTE — PROGRESS NOTES
Pt with Hx of ischemic cardiomyopathy and s/p 30 stents  Followed in [facility]   Is being admitted for another episode of CP with EKG changes concerning for ischemia  Had a cardiac cath recently in [facility] >> medical MGT only   End stage CAD as per Cardiologist in [address]

## 2019-08-21 NOTE — CONSULTS
[facility] - Consult Note    Date of  Admission: 8/20/2019  5:55 PM     Primary Care Physician:  Kishor Urena MD     Plan:     1. Recurrent angina- now resolved. Trop < 0.02, <0.02, <0.02. EKG SR, LVH and anterolateral ST-T changes which may be due to strain and old inferior MI. These were described at [facility] on 7/14/2019.  2. End stage CAD h/o 30 stents - Last PCI of SVG-RCA with ROSA 7/2019  3. CABG 1999  4. Ischemic cardiomyopathy - EF 42%, Mild LVH, Trace TR, PAP 30  5. Chronic systolic and diastolic CHF  6. Hypertension: Controlled continue present medications  7. Hyperlipidemia: Continue statins  8. H/O tobacco abuse    Patient has CAD which is extensive had recent PCI. Had one episode of chest pain which has resolved with a single nitroglycerin and has not recurred. Had a recent PCI 1 month ago. Given end-stage CAD described very well and Dr. Priscilla Roger notes from [facility], would continue medical treatment. If she can walk in the hallway without any significant symptoms, okay for discharge from cardiac standpoint. This was discussed with patient and daughter in the room. They understand agree and are anxious to go home. Assessment:     Hospital Problems  Date Reviewed: 8/21/2019          Codes Class Noted POA    Type 2 diabetes mellitus with diabetic polyneuropathy, without long-term current use of insulin (HCC) ICD-10-CM: E11.42  ICD-9-CM: 250.60, 357.2  2/20/2018 Yes        CAD (coronary artery disease) ICD-10-CM: I25.10  ICD-9-CM: 414.00  11/16/2012 Unknown    Overview Signed 9/18/2018  7:16 AM by Preethi Nunes     2. NSTEMI with troponin increased to 4.7 - recurrent CP despite stable H/H, 4 anti-anginals. CAth with high grade restenosis of prox SVG-RCA s/p cutting balloon angioplasty with good result  3. Hx CAD:                        A.   CABG in 1999: L-radial graft to OM1-OM2, [id number]                        B.  H/o multivessel stenting of LAD x 3 ROSA, RCA x 2 ROSA, and sequential SVG x 1 ROSA                        C. Cardiac cath (11/2012) -- occluded RCA and circumflex, LAD with patent stents, LCX with right to left collaterals, Left radial graft to OM-1 and                   OM-2 which is occluded, sequential SVG to acute marginal, distal RCA, and PDA with 50% stenosis between first and second anastomosis.                       N. Cardiac catheterization by Dr. Debra Okeefe 6/13/16 - LM nl, LAD diffuse 30-60% mid, distal 60%, apical LAD small caliber 70-90%, LCx occluded prox,            RCA occluded prox, SVG to R PDA and RV marginal focal 90% just distal to RV marginal touchdown, R PDA prox 30% stenosis focal mid vessel            60% (both in stent) s/p successful PCI of SVG to R PDA using 3.0x22mm Resolute Integrity ROSA                        E. Cardiac catheterization by Dr. Debra Okeefe 1/23/17:  LM wnl, LAD 70% prox, 50-60% ISR in mid LAD, distal LAD with severe diffuse 60-90%, apical LAD                     diffuse 70-90%, LCx occluded proximally, RCA occluded proximally, free radial graft to OM1 and 2 not images, as known to be occluded, SVG to R                 PDA and R PLB patent with supplied R PLB with focal 50-60% ISR unchanged from prior films, other stents widely patent s/p PCI of distal LAD using                  [id number] Synergy ROSA, and PCI of prox and mid LAD using 3.0x28mm Synergy ROSA                         F. Cardiac catheterization 9/13/2017: S/p PCIx2 SVG to RCA 2.5x24mm and 2.5x12mm Synergy ROSA.                       G. NSTEMI s/p cardiac catheterization at [facility] by Dr. Wilfredo Tang 1/19/18: ROSA to mLAD, Occluded RCA, LCX, L radial graft to OM1-OM2 (known to be                 [id number]), 40% pSVG > RCA. plavix stopped and placed on Brilinta                         H.  Cardiac catheterization 4/10/18 by Dr. Frances Dent: severe 3-V CAD with LAD that has been extensively stented throughout vessel with area of 50% in stent stenosis, occluded stents to the LCx with left to left collaterals into the OM, occluded RCA, 60-70% ostial narrowing of the SVG to PDA                         I. [facility] 4/20/2018 ( Surya Constanza) 4/20/2018: severe multivessel native CAD including total occlusion of prox RCA and LCx. Mod to severe ISR of the distal  LAD, mod ISR of the SVG to RCA with known total occlusion of additional grafts. Severe RLE PVD with limited angiography. REC: extensive severe                      multivessel disease with a lone remaining graft and NO reasonable target for PCI. Medical treatment. J.  Coronary angiography 9/17/18 with high grade focal in-stent restenosis of SVG-RCA; PTCA with Angiosculpt cutting balloon with good result. Angiography otherwise unchanged - Diffuse small vessel disease. 3. ICM, EF 41%                        - basal-mid inferior wall and inferolateral wall hypokinesis, grade I diastolic dysfunction by last echo 9/2018                 * (Principal) Chest pain ICD-10-CM: R07.9  ICD-9-CM: 786.50  2/17/2012 Unknown        Acquired hypothyroidism ICD-10-CM: E03.9  ICD-9-CM: 244.9  7/1/2010 Yes    Overview Addendum 9/10/2018  9:20 AM by Trudi Johnson MD     Overview:   NEW DX             Essential hypertension ICD-10-CM: I10  ICD-9-CM: 401.9  2/14/2008 Unknown                   History of Present Illness: This patient has been seen and evaluated at the request of Dr. Tony [facility] for chest pain and abnormal EKG      This is a 79 y.o. female admitted for Chest pain [R07.9]. Patient complains of:        Patient is a 72-year -American female who is moderately obese and presented with a history of chest pain. S/p sublingual nitroglycerin which relieved the pain and it has not recurred. H/O intensive coronary artery disease with previous multiple PCI's and apparently 30 stents as well as previous CABG. has chronic dyspnea on exertion and frequent mild chest discomforts. Denies any palpitations dizziness or loss of consciousness.   No significant edema    No fever or chills. No diaphoresis. No leg swelling. No recent syncopal episodes. No blood in stool or urine. No nausea or vomit. No recent CVA.          Past Medical History:     Past Medical History:   Diagnosis Date    Adenomatous polyp of colon 05/18/12    Alopecia, unspecified 02/14/08    Anemia     Asthma     Asthma     Blood transfusion abn reaction or complication, no procedure mishap     Cataracts, bilateral     Chest pain     Cholecystitis     Chronic back pain     Colorectal polyps     Constipation     Coronary stent occlusion 05/26/10    Cystic kidney disease 2008    Depression     Diabetes mellitus     Diabetes mellitus ([facility] [address]     Diarrhea     Emphysema     Exposure to communicable disease     Gastrointestinal ulcer     Gout     H/O seasonal allergies     Headache(784.0)     Heart disease     Heart murmur     Heartburn     History of unstable angina 02/17/12    HSV (herpes simplex virus) infection 2010    Hypercholesteremia     Hypertension     Hypokalemia 04/04/12    Kidney stone     Muscle atrophy     OA (osteoarthritis of spine)     Phlebitis     Pregnancy     Pulmonary hypertension (HCC)     Reflux     Renal artery stenosis (HCC)     Renal insufficiency     Rheumatic fever     Rheumatoid arthritis(714.0)     Sleep apnea 2007    Stomach ulcer     Systemic lupus erythematosus ([facility] [address]     Thyroid dysfunction     Tuberculosis of lung     Unspecified constipation 02/14/08    Unspecified hypothyroidism 07/01/10         Social History:     Social History     Socioeconomic History    Marital status:      Spouse name: Not on file    Number of children: Not on file    Years of education: Not on file    Highest education level: Not on file   Tobacco Use    Smoking status: Former Smoker    Smokeless tobacco: Never Used   Substance and Sexual Activity    Alcohol use: No    Drug use: No    Sexual activity: Yes        Family History:     Family History   Problem Relation Age of Onset    Ovarian Cancer Sister         Medications:      Allergies   Allergen Reactions    Latex Other (comments)    Propoxyphene N-Acetaminophen Other (comments)    Seafood [Iodine And Iodide Containing Products] Hives and Swelling    Shellfish Derived Other (comments)    Ace Inhibitors Other (comments)     Lotensin    Adhesive Other (comments)    Dexamethasone Diarrhea and Nausea and Vomiting    Iodine Other (comments)    Levofloxacin Hives, Itching and Swelling     Trouble breathing    Metronidazole Other (comments)    Niaspan [Niacin] Other (comments)     flushing    Reglan [Metoclopramide] Other (comments)        Current Facility-Administered Medications   Medication Dose Route Frequency    ketorolac (TORADOL) 15 mg/mL injection        lidocaine (XYLOCAINE) 2 % viscous solution 15 mL  15 mL Mouth/Throat Q3H PRN    albuterol-ipratropium (DUO-NEB) 2.5 MG-0.5 MG/3 ML  3 mL Nebulization Q6H PRN    allopurinol (ZYLOPRIM) tablet 100 mg  100 mg Oral DAILY    aspirin chewable tablet 81 mg  81 mg Oral DAILY    atorvastatin (LIPITOR) tablet 80 mg  80 mg Oral DAILY    carvedilol (COREG) tablet 6.25 mg  6.25 mg Oral BID WITH MEALS    clopidogrel (PLAVIX) tablet 75 mg  75 mg Oral DAILY    DULoxetine (CYMBALTA) capsule 60 mg  60 mg Oral DAILY    pantoprazole (PROTONIX) tablet 40 mg  40 mg Oral ACB    ferrous sulfate tablet 325 mg  325 mg Oral ACB    fluticasone-vilanterol (BREO ELLIPTA) 100mcg-25mcg/puff  1 Puff Inhalation QAM RT    folic acid (FOLVITE) tablet 1 mg  1 mg Oral DAILY    furosemide (LASIX) tablet 40 mg  40 mg Oral BID    gabapentin (NEURONTIN) capsule 400 mg  400 mg Oral TID    guaiFENesin (ROBITUSSIN) 100 mg/5 mL oral liquid 100 mg  100 mg Oral Q4H PRN    isosorbide mononitrate ER (IMDUR) tablet 120 mg  120 mg Oral 7am    levothyroxine (SYNTHROID) tablet 88 mcg  88 mcg Oral 6am    losartan (COZAAR) tablet 50 mg  50 mg Oral DAILY    nitroglycerin (NITROSTAT) tablet 0.4 mg  0.4 mg SubLINGual PRN    potassium chloride (KLOR-CON) tablet 10 mEq  10 mEq Oral DAILY    ranolazine ER (RANEXA) tablet 1,000 mg  1,000 mg Oral Q12H    naloxone (NARCAN) injection 0.4 mg  0.4 mg IntraVENous PRN    ondansetron (ZOFRAN) injection 4 mg  4 mg IntraVENous Q6H PRN    docusate sodium (COLACE) capsule 100 mg  100 mg Oral BID PRN    apixaban (ELIQUIS) tablet 5 mg  5 mg Oral Q12H    insulin lispro (HUMALOG) injection   SubCUTAneous Q6H    glucose chewable tablet 16 g  4 Tab Oral PRN    glucagon (GLUCAGEN) injection 1 mg  1 mg IntraMUSCular PRN    dextrose 10% infusion 125-250 mL  125-250 mL IntraVENous PRN        Review Of Systems:       Denies any recent fever cough cold vomiting diarrhea hematuria dysuria. Has a history of nosebleeds on triple anticoagulation. She is now taking aspirin every other day in addition to Eliquis and Plavix. She is followed by Dr. Althea Gaspar. Physical Exam:     Visit Vitals  /79 (BP 1 Location: Right arm, BP Patient Position: At rest)   Pulse 70   Temp 98.4 °F (36.9 °C)   Resp 16   Ht 5' (1.524 m)   Wt 72.6 kg (160 lb)   SpO2 97%   Breastfeeding? No   BMI 31.25 kg/m²     BP Readings from Last 3 Encounters:   08/21/19 134/79   04/01/19 118/73   03/25/19 114/64     Pulse Readings from Last 3 Encounters:   08/21/19 70   04/01/19 88   03/25/19 79     Wt Readings from Last 3 Encounters:   08/20/19 72.6 kg (160 lb)   04/01/19 71.8 kg (158 lb 4.6 oz)   03/25/19 83.8 kg (184 lb 11.9 oz)       General:  alert, cooperative, no distress, appears stated age  Skin: Warm and dry, acyanotic, normal color. Head: Normocephalic, atraumatic. Eyes: Sclerae anicteric, conjunctivae without injection.   Neck:  nontender, no nuchal rigidity, no masses, no stridor, no carotid bruit, no JVD  Lungs:  clear to auscultation bilaterally, without rhonchi or wheezes, diminished breath sounds R base, L base  Heart:  regular rate and rhythm, S1, S2 normal, no murmur, click, rub or gallop  Abdomen:  abdomen is soft without significant tenderness, masses, organomegaly or guarding  Extremities:  extremities normal, atraumatic, no cyanosis or edema  Neurological: grossly intact. No focal abnormalities, moves all extremities well. Psychiatric Affect: The patient is awake, alert and oriented x3. Richelle Jose Miguel is interactive and appropriate. Data Review:     Recent Results (from the past 48 hour(s))   EKG, 12 LEAD, INITIAL    Collection Time: 08/20/19  6:01 PM   Result Value Ref Range    Ventricular Rate 66 BPM    Atrial Rate 66 BPM    P-R Interval 188 ms    QRS Duration 88 ms    Q-T Interval 422 ms    QTC Calculation (Bezet) 442 ms    Calculated P Axis 72 degrees    Calculated R Axis -36 degrees    Calculated T Axis 148 degrees    Diagnosis       Normal sinus rhythm  Left axis deviation  Left ventricular hypertrophy with repolarization abnormality  Abnormal ECG  No previous ECGs available     CBC WITH AUTOMATED DIFF    Collection Time: 08/20/19  6:40 PM   Result Value Ref Range    WBC 11.1 4.6 - 13.2 K/uL    RBC 4.42 4.20 - 5.30 M/uL    HGB 13.0 12.0 - 16.0 g/dL    HCT 38.7 35.0 - 45.0 %    MCV 87.6 74.0 - 97.0 FL    MCH 29.4 24.0 - 34.0 PG    MCHC 33.6 31.0 - 37.0 g/dL    RDW 14.2 11.6 - 14.5 %    PLATELET 029 466 - 775 K/uL    MPV 11.0 9.2 - 11.8 FL    NEUTROPHILS 83 (H) 40 - 73 %    LYMPHOCYTES 12 (L) 21 - 52 %    MONOCYTES 4 3 - 10 %    EOSINOPHILS 1 0 - 5 %    BASOPHILS 0 0 - 2 %    ABS. NEUTROPHILS 9.2 (H) 1.8 - 8.0 K/UL    ABS. LYMPHOCYTES 1.4 0.9 - 3.6 K/UL    ABS. MONOCYTES 0.4 0.05 - 1.2 K/UL    ABS. EOSINOPHILS 0.1 0.0 - 0.4 K/UL    ABS.  BASOPHILS 0.0 0.0 - 0.1 K/UL    DF AUTOMATED     METABOLIC PANEL, COMPREHENSIVE    Collection Time: 08/20/19  6:40 PM   Result Value Ref Range    Sodium 141 136 - 145 mmol/L    Potassium 5.0 3.5 - 5.5 mmol/L    Chloride 105 100 - 111 mmol/L    CO2 27 21 - 32 mmol/L    Anion gap 9 3.0 - 18 mmol/L Glucose 217 (H) 74 - 99 mg/dL    BUN 14 7.0 - 18 MG/DL    Creatinine 1.04 0.6 - 1.3 MG/DL    BUN/Creatinine ratio 13 12 - 20      GFR est AA >60 >60 ml/min/1.73m2    GFR est non-AA 53 (L) >60 ml/min/1.73m2    Calcium 9.0 8.5 - 10.1 MG/DL    Bilirubin, total 0.6 0.2 - 1.0 MG/DL    ALT (SGPT) 24 13 - 56 U/L    AST (SGOT) 46 (H) 10 - 38 U/L    Alk.  phosphatase 117 45 - 117 U/L    Protein, total 7.3 6.4 - 8.2 g/dL    Albumin 3.7 3.4 - 5.0 g/dL    Globulin 3.6 2.0 - 4.0 g/dL    A-G Ratio 1.0 0.8 - 1.7     TROPONIN I    Collection Time: 08/20/19  6:40 PM   Result Value Ref Range    Troponin-I, QT <0.02 0.0 - 0.045 NG/ML   NT-PRO BNP    Collection Time: 08/20/19  6:40 PM   Result Value Ref Range    NT pro-BNP 2,113 (H) 0 - 900 PG/ML   TROPONIN I    Collection Time: 08/20/19 10:01 PM   Result Value Ref Range    Troponin-I, QT <0.02 0.0 - 0.045 NG/ML   GLUCOSE, POC    Collection Time: 08/21/19  1:00 AM   Result Value Ref Range    Glucose (POC) 214 (H) 70 - 110 mg/dL   CARDIAC PANEL,(CK, CKMB & TROPONIN)    Collection Time: 08/21/19  4:44 AM   Result Value Ref Range    CK 93 26 - 192 U/L    CK - MB 1.6 <3.6 ng/ml    CK-MB Index 1.7 0.0 - 4.0 %    Troponin-I, QT <0.02 0.0 - 8.317 NG/ML   METABOLIC PANEL, BASIC    Collection Time: 08/21/19  4:44 AM   Result Value Ref Range    Sodium 139 136 - 145 mmol/L    Potassium 3.5 3.5 - 5.5 mmol/L    Chloride 103 100 - 111 mmol/L    CO2 31 21 - 32 mmol/L    Anion gap 5 3.0 - 18 mmol/L    Glucose 108 (H) 74 - 99 mg/dL    BUN 15 7.0 - 18 MG/DL    Creatinine 1.10 0.6 - 1.3 MG/DL    BUN/Creatinine ratio 14 12 - 20      GFR est AA >60 >60 ml/min/1.73m2    GFR est non-AA 50 (L) >60 ml/min/1.73m2    Calcium 8.9 8.5 - 10.1 MG/DL   MAGNESIUM    Collection Time: 08/21/19  4:44 AM   Result Value Ref Range    Magnesium 2.0 1.6 - 2.6 mg/dL   PHOSPHORUS    Collection Time: 08/21/19  4:44 AM   Result Value Ref Range    Phosphorus 2.8 2.5 - 4.9 MG/DL   CBC WITH AUTOMATED DIFF    Collection Time: 08/21/19 4:44 AM   Result Value Ref Range    WBC 8.8 4.6 - 13.2 K/uL    RBC 4.52 4.20 - 5.30 M/uL    HGB 13.3 12.0 - 16.0 g/dL    HCT 39.9 35.0 - 45.0 %    MCV 88.3 74.0 - 97.0 FL    MCH 29.4 24.0 - 34.0 PG    MCHC 33.3 31.0 - 37.0 g/dL    RDW 14.4 11.6 - 14.5 %    PLATELET 261 617 - 348 K/uL    MPV 11.4 9.2 - 11.8 FL    NEUTROPHILS 68 40 - 73 %    LYMPHOCYTES 24 21 - 52 %    MONOCYTES 7 3 - 10 %    EOSINOPHILS 1 0 - 5 %    BASOPHILS 0 0 - 2 %    ABS. NEUTROPHILS 5.9 1.8 - 8.0 K/UL    ABS. LYMPHOCYTES 2.1 0.9 - 3.6 K/UL    ABS. MONOCYTES 0.6 0.05 - 1.2 K/UL    ABS. EOSINOPHILS 0.1 0.0 - 0.4 K/UL    ABS. BASOPHILS 0.0 0.0 - 0.1 K/UL    DF AUTOMATED     GLUCOSE, POC    Collection Time: 08/21/19  6:29 AM   Result Value Ref Range    Glucose (POC) 138 (H) 70 - 110 mg/dL   GLUCOSE, POC    Collection Time: 08/21/19  7:29 AM   Result Value Ref Range    Glucose (POC) 119 (H) 70 - 110 mg/dL   EKG, 12 LEAD, SUBSEQUENT    Collection Time: 08/21/19 10:35 AM   Result Value Ref Range    Ventricular Rate 75 BPM    Atrial Rate 75 BPM    P-R Interval 204 ms    QRS Duration 90 ms    Q-T Interval 412 ms    QTC Calculation (Bezet) 460 ms    Calculated P Axis 42 degrees    Calculated R Axis -39 degrees    Calculated T Axis 126 degrees    Diagnosis       Normal sinus rhythm  Left axis deviation  Left ventricular hypertrophy with repolarization abnormality  Inferior infarct , age undetermined  Abnormal ECG  When compared with ECG of 20-AUG-2019 18:01,  No significant change was found     GLUCOSE, POC    Collection Time: 08/21/19 11:10 AM   Result Value Ref Range    Glucose (POC) 251 (H) 70 - 110 mg/dL         Intake/Output Summary (Last 24 hours) at 8/21/2019 1237  Last data filed at 8/21/2019 0604  Gross per 24 hour   Intake 240 ml   Output 600 ml   Net -360 ml       Cardiographics:     ECG: SR with LVH, t-wave abnormality    Echocardiogram: 4/2019     BASAL INFEROLATERAL, INFERIOR AND APICAL SEPTAL HYPOKINESIS IS PRESENT.    MILD LEFT VENTRICULAR HYPERTROPHY PRESENT. EJECTION FRACTION OF 42% WITH GRADE I DIASTOLIC DYSFUNCTION. NORMAL RIGHT VENTRICULAR SIZE WITH LOW NORMAL SYSTOLIC FUNCTION. TRACE TRICUSPID REGURGITATION WITHOUT PULMONARY HYPERTENSION (30 MMHG). NO SIGNIFICANT CHANGE FROM PREVIOUS STUDY DONE 11/14/2018 FOR CHEST PAIN. Cardiac cath 7/2019  IMPRESSION:  1. Coronaries:  Severe 3 vessel disease. Circ, RCA have been occluded and unchanged. There is diffuse in-stent restenosis of the LAD but it all looks relatively unchanged. The ostial SVG has severe stenosis and was treated with ROSA in July 2019. Signed By: Christine Prakash NP     August 21, 2019      I have independently evaluated and examined the patient. All relevant labs and testing data's are reviewed. Care plan discussed and updated after review.   Melissa Trinidad MD

## 2019-08-21 NOTE — DISCHARGE SUMMARY
[address] Medicine  Discharge Summary    Patient: Wilian Cardona MRN: [id number]  CSN: [id number]    YOB: 1950  Age: 79 y.o. Sex: female      Admission Date: 8/20/2019 Discharge Date: 8/21/19   Attending: Yumiko Sol MD PCP: Xavier Marcelino MD     ===================================================================    Reason for Admission: Chest pain [R07.9]    Discharge Diagnoses:   Recurrent angina  End stage CAD  Ischemic cardiomyopathy  PAD s/p AKA  Asthma  HTN/ HLD  DM type 2  Gout  Hypothyroidsim    Important notes to PCP/ follow-up studies and evaluations   - She needs to see the dentist to f/u for tooth infection.  - She needs f/u with outpatient cardiology Dr. Divya Osborne  - She is having vaginal discharge, she was supposed to have NuSwab ordered in the office, either follow up with results or obtain NuSwab. Pending labs and studies:  none    Operative Procedures:   none    Discharge Medications:     Current Discharge Medication List      START taking these medications    Details   lidocaine (XYLOCAINE) 2 % solution Take 15 mL by mouth every three (3) hours as needed for Pain for up to 30 days. Indications: mouth irritation  Qty: 1 Bottle, Refills: 0      apixaban (ELIQUIS) 5 mg tablet Take 1 Tab by mouth every twelve (12) hours for 30 days. Qty: 60 Tab, Refills: 0      amoxicillin-clavulanate (AUGMENTIN) 875-125 mg per tablet Take 1 Tab by mouth two (2) times a day for 5 days. Qty: 10 Tab, Refills: 0      fluconazole (DIFLUCAN) 150 mg tablet Take 1 Tab by mouth daily for 2 doses. Take 1 tab today. If symptoms still do not improve after 7 days. Take the other dose. Qty: 2 Tab, Refills: 0         CONTINUE these medications which have CHANGED    Details   aspirin 81 mg tablet Take 1 Tab by mouth every other day for 30 days. Qty: 15 Tab, Refills: 0      ranolazine ER (RANEXA) 1,000 mg Take 1 Tab by mouth two (2) times a day for 30 days.   Qty: 60 Tab, Refills: 1 CONTINUE these medications which have NOT CHANGED    Details   nystatin (MYCOSTATIN) powder Apply  to affected area two (2) times a day. Qty: 60 g, Refills: 2      furosemide (LASIX) 40 mg tablet Take 1 Tab by mouth two (2) times a day. At 8am and 2pm  Qty: 30 Tab, Refills: 1      DULoxetine (CYMBALTA) 60 mg capsule Take 1 Cap by mouth daily. Qty: 30 Cap, Refills: 0      carvedilol (COREG) 6.25 mg tablet Take 1 Tab by mouth two (2) times daily (with meals). Qty: 60 Tab, Refills: 0      famotidine (PEPCID) 40 mg tablet Take 1 Tab by mouth daily. Qty: 30 Tab, Refills: 0      guaiFENesin (ROBITUSSIN) 100 mg/5 mL liquid Take 5 mL by mouth every four (4) hours as needed for Cough. Qty: 118 mL, Refills: 0      insulin NPH/insulin regular (HUMULIN 70/30 U-100 INSULIN) 100 unit/mL (70-30) injection 10-15 Units by SubCUTAneous route two (2) times a day. potassium chloride SR (KLOR-CON 10) 10 mEq tablet Take 10 mEq by mouth daily. glucose blood VI test strips (ASCENSIA AUTODISC VI, ONE TOUCH ULTRA TEST VI) strip by Does Not Apply route See Admin Instructions. ferrous sulfate 325 mg (65 mg iron) tablet Take 325 mg by mouth Daily (before breakfast). ascorbic acid, vitamin C, (VITAMIN C) 500 mg tablet Take 500 mg by mouth daily. levothyroxine (SYNTHROID) 88 mcg tablet Take 88 mcg by mouth Daily (before breakfast). losartan (COZAAR) 50 mg tablet Take 1 Tab by mouth daily. Qty: 30 Tab, Refills: 1      clopidogrel (PLAVIX) 75 mg tab Take 75 mg by mouth daily. gabapentin (NEURONTIN) 600 mg tablet Take 600 mg by mouth three (3) times daily. nitroglycerin (NITROSTAT) 0.4 mg SL tablet 1 Tab by SubLINGual route as needed for Chest Pain (x3). Qty: 1 Bottle, Refills: 1      allopurinol (ZYLOPRIM) 100 mg tablet Take 100 mg by mouth daily. glimepiride (AMARYL) 4 mg tablet Take  by mouth two (2) times a day. atorvastatin (LIPITOR) 80 mg tablet Take 80 mg by mouth daily. folic acid (FOLVITE) 1 mg tablet Take 1 mg by mouth daily. isosorbide mononitrate ER (IMDUR) 120 mg CR tablet Take 120 mg by mouth every morning. fluticasone furoate-vilanterol (BREO ELLIPTA) 100-25 mcg/dose inhaler Take 1 Puff by inhalation daily. Qty: 1 Inhaler, Refills: 0      albuterol (PROVENTIL HFA, VENTOLIN HFA, PROAIR HFA) 90 mcg/actuation inhaler Take 2 Puffs by inhalation every four (4) hours as needed for Wheezing. Qty: 1 Inhaler, Refills: 0      lactobacillus acidophilus (BACID) cap Take 2 Caps by mouth daily. Disposition: Home    Consultants:    cardiology    Brief Hospital Course (including pertinent history and physical findings)  79 y.o. female with PMH end stage CAD, ischemic cardiomyopathy, PAD s/p AKA, , now admitted with chest pain for ACS r/o given extensive cardiac history below.     Extensive history below taken from cardiology notes:  1. CABG x5 1999   2. multivessel stenting of LAD x3 ROSA, RCA x2 ROSA, and sequential SVG x1 ROSA  3. Cath (11/2012) -- occluded RCA and Cx, LAD patent stents, LCX R-L collaterals, Left radial graft to OM-1 and OM-2 occluded, seqSVG-acute marginal/dRCA/PDA with 50% stenosis  4. NST 4/2015 - mild lateral ischemia and normal EF 60%  5. LHC 11/17/15 ([facility]) - occluded Cx, known occluded seq radial -OM1/OM2, occluded native RCA, patent stent LAD, 60-70% mid LAD, 70% far distal LAD, 90% SVG - dRCA to first distal anastomosis with 90% focal ISR in RPDA. s/p PCI x 2 to R Coronary system (feeding R PDA and some collateralization to circ) with 3.0x18mm Xience ROSA to SVG lesion and 2.5x15mm Xience ROSA to PDA lesion with in stent restenosis  6. Recurrent CP postcath, repeat LHC 11/20/2015 ([facility]):  areas of ISR s/p angioplasty patent  7.  LHC ([facility]) 6/13/16 - LM nl, LAD diffuse 30-60% mid, distal 60%, apical LAD small caliber 70-90%, LCx occluded prox, RCA occluded prox, SVG to R PDA and RV marginal focal 90% just distal to RV marginal touchdown, R PDA prox 30% stenosis focal mid vessel 60% (both in stent) s/p successful PCI of SVG to R PDA using 3.0x22mm Resolute Integrity ROSA  8. C 1/23/17 ([facility]) -- LM wnl, LAD 70% prox, 50-60% ISR in mid LAD, distal LAD with severe diffuse 60-90%, apical LAD diffuse , LCx occluded proximally, RCA occluded proximally, free radial graft to OM1 and 2 not imaged (known occluded),  SVG to R PDA and R PLB patent with supplied R PLB with focal 50-60% ISR unchanged from prior films, other stents widely patent s/p PCI of distal LAD using [id number] Synergy ROSA, and PCI of prox and mid LAD using 3.0x28mm Synergy ROSA   9. C 9/13/2017:  S/p PCI x 2 SVG to RCA 2.5x24mm and 2.5x12mm Synergy ROSA  10. NSTEMI s/p cath 1/19/18 - s/p LAD ISRS with 3.0 x 16 mm Synergy ROSA, L radial graft to OM1-OM2 (known to be occluded)--residual 40% proximal SVG to R PDA stenosis  11. NST 2/5/18: small, mild mixed scar and ischemia in inferolateral wall, EF 51% with global hypokinesis   12. Cath 2/26/18 ([facility]) - total occlusion of native RCA and LCx, known occlusion of seq graft to OMs, no sig LM disease, 40-50% ISR of mid-distal LAD, new ostial 80% SVG-RCA, LCs collateralized from both SVG-RCA and from LAD, s/p PCI of ostial SVG-RCA. continued on brilinta, asa  13. [facility] 4/10/18 ([facility]) - severe 3 VCAD with LAD with extensive stenting, 50% end of stent; occluded stents to circ with L>L collaterals into the OM; occluded RCA; 60-70% ostial narrowing of the SVG-PDA  --- 70% ostial stenosis of the RCA vein graft to no residual using a 3.0 x 12 Synergy stent   14. [facility] 4/20/18 ([facility]) - severe multivessel native CAD including total occlusion of prox RCA and LCx. Mod to severe ISR of the distal LAD, mod ISR of the SVG to RCA with known total occlusion of additional grafts.  Severe RLE PVD with limited angiography. REC: extensive severe multivessel disease with a lone remaining graft and NO reasonable target for PCI -- Medical treatment.   15. NSTEMI s/p cath 9/17/18 (at [facility]) - instent restenosis of SVG to RCA treated with 2.5x10mm Angiosculpt cutting balloon  - S/p R radial artery occlusion noted on PVL 9/25/18, managed conservatively per Vascular  16. UA s/p cath 11/19/18 [facility]): Totally occluded SVG to RCA s/p PCI (3.0 x 28 mm Xience ROSA) recommend triple AC with ASA, Eliquis, and Plavix  17. NSTEMI s/p cath 1/9/19 [facility]):  Progressive disease of prox to mid LAD with 90% stenosis, treated with angiosculpt 3.0mm PTCA balloon.  RCA and LCx both with prox chronic 100% total occlusion. Patent SVT to PDA and PLB. Mid body of graft contains eccentric 50% stenosis. prox stent contains 20% stenosis -- started on Eliquis 2.5mg BID for CAD  18. NSTEMI & C 3/2619 [facility]): 99% LAD s/p robotic PCI with placement of ROSA -- Eliquis stopped due to hemoptysis/epistaxis  19. Cardiac cath 4/11/2019: three-vessel coronary artery disease with occluded circumflex marginal with minimal collateral flow. She was noted to have a mild to moderate in-stent restenosis of the mid to distal LAD. She had patent vein graft to the LAD. Her left ventricular end-diastolic pressure was elevated at 28 mmHg.     Ischemic cardiomyopathy:   1. Echo 1/19/18 - EF 41%, Basal-mid inferior wall and inferolateral wall hypokinetic, PAP 23 mmHg   2. Echo 9/12/18 - EF 44%  3. Echo 11/14/18 - EF 35%, WMAs noted, hypokinesis of inferolateral, basal to mid inferior, basal to mid inferoseptal wall segments, nearly akinetic basal inferior and inferolateral segments, grade III diastolic dysfunction  4. Echo 2/21/19 - EF 45-50%  5. Echo 3/27/19 - EF 32% w/ severe hypokinesis of inferolateral, apical septal and anteroseptal walls, akinesis of distal inferoseptal wall  6. Echo 4/9/19 - EF 42%, Mild LVH, Trace TR, PAP 30        Chest pain with H/o End stage CAD, multivessel stenting, NSTEMI x4, ischemic cardiomyopathy, Chronic systolic heart failure  See history as above.  She presented with recurrent angina secondary to extensive history above. Cardiology saw and evaluated her, they recommended no additional intervention at this time. To continue current home medications below. Most recent [facility] 7/17/19:  1. Left main coronary artery: patent  2. Left anterior descending coronary artery: extensive prior stents in the LAD. Essentially from mid to distal and small apical stent. There is diffuse instent re-stenosis. This is at worst 50% in distal LAD. 3. Left circumflex coronary artery: non dominant, 100% occluded  4. Right coronary artery: dominant 100% occluded  5. SVG to RCA- severe ostial instent restenosis 80%  PCI 7/17/19:  Successful PCI of SVG-RCA ostia with drug eluding stent. - Continue home eliquis 5 mg q12 hours, asa 81 mg every other day (h/o nosebleeds), coreg 6.25 mg BID, plavix 75 mg daily, furosemide 40 mg BID, ranexa 1000 mg [id number]    Dental Caries with possible dental abscess:  Left maxillary premolar with broken tooth and dental caries. Overall poor dentition.   - Will give Augmentin for 5 days.   - Oral viscous lidocaine for pain. - Will need f/u with dentist for tooth extraction. Vaginal discharge:  Vaginal discharge is thick and clear. Endorses h/o prior when she takes antibiotics. She was evaluated in the office prior to admission. Outpatient record said NuSwab ordered, but patient states no swab was done. - Will treat with fluconazole once. Told her if she still has symptoms in 1 week. Take another dose. She might still have symptoms given she needs to continue antibiotics for 5 days for tooth infection.     HTN, HLD  Blood pressure stable. - Continue home imdur 120 mg TID, losartan 50 mg daily  - Continue home atorvastatin 80 mg daily     Normocytic anemia, with h/o GI bleed 2012 and 2013  Likely iron deficiency given iron studies.    Last iron studies 9/11/2018: iron 26, TIBC 415, Ferritin 10.3  - Continue home ferrous sulfate 325 mg daily     DM type 2  Last Hgb A2UBsrv 2019 8.4   On home novolin 70/30 10 U twice daily with meals. Will hold here. - Accuchecks, SSI     Asthma, [place] not on CPAP, h/o tobacco use  Stable. On home breo ellipta and proventil. Will hold. - Duo-neb as needed     Gout- Stable, continue home allopurinol 100 mg daily     Hypothyroidism  TSH 0.72 may 2019  - Continue home levothyroxine 88 mg daily       CURRENT ADMISSION IMAGING RESULTS   Xr Chest Pa Lat    Result Date: 8/20/2019  IMPRESSION: Negative chest.        Cardiology Procedures/Testing:  MODALITY RESULTS   EKG Results for orders placed or performed during the hospital encounter of 08/20/19   EKG, 12 LEAD, INITIAL   Result Value Ref Range    Ventricular Rate 66 BPM    Atrial Rate 66 BPM    P-R Interval 188 ms    QRS Duration 88 ms    Q-T Interval 422 ms    QTC Calculation (Bezet) 442 ms    Calculated P Axis 72 degrees    Calculated R Axis -36 degrees    Calculated T Axis 148 degrees    Diagnosis       Normal sinus rhythm  Left axis deviation  Left ventricular hypertrophy with repolarization abnormality  Abnormal ECG  No previous ECGs available         ECHO     Nuclear Medicine No results found for this or any previous visit. IR No results found for this or any previous visit.    CATH       Special Testing/Procedures:  MODALITY RESULTS   MICRO All Micro Results     Procedure Component Value Units Date/Time    CULTURE, URINE [[id number]]     Order Status:  Sent Specimen:  Cath Urine          ABG Lab Results   Component Value Date/Time    pH 7.444 03/17/2019 10:03 PM    PCO2 35.2 03/17/2019 10:03 PM    PO2 52.4 (LL) 03/17/2019 10:03 PM    BICARBONATE 24.2 03/17/2019 10:03 PM    FIO2 28 03/17/2019 10:03 PM      UA Results for orders placed or performed in visit on 01/15/15   AMB POC URINALYSIS DIP STICK AUTO W/O MICRO     Status: None   Result Value Ref Range Status    Color (UA POC) Yellow  Final    Clarity (UA POC) Clear  Final    Glucose (UA POC) 1+ Negative Final    Bilirubin (UA POC) Negative Negative Final Ketones (UA POC) Negative Negative Final    Specific gravity (UA POC) 1.020 1.001 - 1.035 Final    Blood (UA POC) Negative Negative Final    pH (UA POC) 6.0 4.6 - 8.0 Final    Protein (UA POC) Negative Negative mg/dL Final    Urobilinogen (UA POC) 0.2 mg/dL 0.2 - 1 Final    Nitrites (UA POC) Negative Negative Final    Leukocyte esterase (UA POC) Negative Negative Final        Laboratory Results:  LABORATORY RESULTS   HEMATOLOGY Lab Results   Component Value Date/Time    WBC 8.8 08/21/2019 04:44 AM    HGB 13.3 08/21/2019 04:44 AM    HCT 39.9 08/21/2019 04:44 AM    PLATELET 753 37/88/4418 04:44 AM    MCV 88.3 08/21/2019 04:44 AM       CHEMISTRIES Lab Results   Component Value Date/Time    Sodium 139 08/21/2019 04:44 AM    Potassium 3.5 08/21/2019 04:44 AM    Chloride 103 08/21/2019 04:44 AM    CO2 31 08/21/2019 04:44 AM    Anion gap 5 08/21/2019 04:44 AM    Glucose 108 (H) 08/21/2019 04:44 AM    BUN 15 08/21/2019 04:44 AM    Creatinine 1.10 08/21/2019 04:44 AM    BUN/Creatinine ratio 14 08/21/2019 04:44 AM    GFR est AA >60 08/21/2019 04:44 AM    GFR est non-AA 50 (L) 08/21/2019 04:44 AM    Calcium 8.9 08/21/2019 04:44 AM      HEPATIC FUNCTION Lab Results   Component Value Date/Time    Albumin 3.7 08/20/2019 06:40 PM    Bilirubin, total 0.6 08/20/2019 06:40 PM    Protein, total 7.3 08/20/2019 06:40 PM    Globulin 3.6 08/20/2019 06:40 PM    A-G Ratio 1.0 08/20/2019 06:40 PM    AST (SGOT) 46 (H) 08/20/2019 06:40 PM    ALT (SGPT) 24 08/20/2019 06:40 PM    Alk.  phosphatase 117 08/20/2019 06:40 PM       LACTIC ACID No results found for: [place]   CARDIAC PANEL Lab Results   Component Value Date/Time    CK 93 08/21/2019 04:44 AM    CK - MB 1.6 08/21/2019 04:44 AM    CK-MB Index 1.7 08/21/2019 04:44 AM    Troponin-I 0.320 (H) 03/27/2019 12:48 AM    Troponin-I, QT <0.02 08/21/2019 04:44 AM      NT-proBNP Lab Results   Component Value Date/Time    NT pro-BNP 2,113 (H) 08/20/2019 06:40 PM    NT pro-BNP 7,142.0 (H) 03/26/2019 02:50 PM    NT pro-BNP 2,661.0 (H) 09/12/2018 10:46 AM      THYROID Lab Results   Component Value Date/Time    TSH 3.410 11/13/2015 04:43 AM      LIPID PANEL Lab Results   Component Value Date/Time    Cholesterol, total 214 (H) 01/20/2018 02:24 AM    HDL Cholesterol 35 (L) 01/20/2018 02:24 AM    LDL, calculated 130 01/20/2018 02:24 AM    Triglyceride 246 (H) 01/20/2018 02:24 AM    CHOL/HDL Ratio 6.1 (H) 01/20/2018 02:24 AM         RISK CALCULATORS:  SCORE RESULT   ASCVD The ASCVD Risk score (Cecilia Khanna, et al., 2013) failed to calculate for the following reasons:    ASCVD risk score not calculated    [id number]     HAS-BLED    READMISSION RISK SCORE High Risk            33       Total Score        3 Has Seen PCP in Last 6 Months (Yes=3, No=0)    11 IP Visits Last 12 Months (1-3=4, 4=9, >4=11)    5 Pt. Coverage (Medicare=5 , Medicaid, or Self-Pay=4)    14 Charlson Comorbidity Score (Age + Comorbid Conditions)        Criteria that do not apply:    . Living with Significant Other. Assisted Living. LTAC. SNF.  or   Rehab    Patient Length of Stay (>5 days = 3)           Functional status and cognitive function:    Ambulates  Status: alert, cooperative, no distress, appears stated age  Condition: STABLE  Disposition: home    Diet: Cardiac diet    Code status and advanced care plan: Full ([facility] POST form updated, now FULL code)    [employer]  Relationship: daughter  [phone]     Patient Education:  Patient was educated on the following topics prior to discharge: angina    Follow-up:   Follow-up Information     Follow up With Specialties Details Why Contact Info    jorge alberto fernando  Go on 8/23/2019 hospital f/u at 2:30 [address] [facility], [address]    Darl Scheuermann, MD Cardiology Schedule an appointment as soon as possible for a visit in 1 week hospital follow up [address]el [address], PGY-1   [address] Intern Pager: [phone]   August 21, 2019, 3:55 PM

## 2019-08-21 NOTE — H&P
Intern Admission History and Physical  [facility]      Patient: Dorita Santamaria MRN: [id number]  CSN: [id number]    YOB: 1950  Age: 79 y.o. Sex: female      Admission Date: 8/20/2019       HPI:     Dorita Santamaria is a 79 y.o. female with PMH of CAD s/p multivessel stents, CABG 1999, HTN, T2DM, CKD stage 3, anemia, COPD, asthma, now presenting with complaint of acute on chronic chest pain. Chest pain started at 8 am on 8/20 and lasted for 15 minutes. Reports that it went away but came back at 4 pm. Reports pain was associated with left arm aching and nausea. Also reports 1 week of abdominal pain. States that chest pain occurs almost daily. Denies SOB. States she was seen in the [facility] office for same. EKG in office showed NSR with q waves in 1, AVR, and TWI in V4-V6 concerning for lateral MI. Given EKG findings he was sent to the office. Yesterday morning woke up with chest pain, radiate to left neck shoulder and down arm, more achy initially then became more tight. H/o prior. No trigger. Nitro improved pain. Nothing made worse. Left upper toothache started 2 weeks ago. Abscess noted at the time by dentist, put on ABX. She cannot remember which antibiotic it was, but  Also states decreased PO intake as she is not feeling well because of tooth pain. Reports that she was given 12 days of antibiotics, but took about 5 days. Is planning to make and appointment to see the dentist to get the tooth pulled. Also endorsing vaginal discharge and thinks she has a yeast infection. States it started 4 days ago. Reports that it is thick clear discharge. States she has had a h/o prior and likely due to ABX as this has happened in the past. Did not take anything for the discharge. She went to the office     EKG in [facility] office : NSR with q waves in 1, AVR and TWI in V4-6. Last admission on [facility]mission [facility] (7/14/2019) for chest pain found to be unstable angina.  LHC and PCI of the SVG-RCA ROSA on 7/17/19. Cardiology consulted in ER. ED Course (See objective for values/interpretations):  Labs obtained:   CBC, BMP, Trops, BMP, EKG, Xr-chest  Trops (18:40): <0.02  NT pro-BNP: 2,113  EKG (18:03): Rate 66, with new ST wave inversion in lateral leads   Imaging obtained: CXR PA/ Lat: negative for acute cardiopulmonary process    Review of Systems   Constitutional: Negative for chills and fever. HENT: Negative for congestion. Eyes: Negative for blurred vision and double vision. Respiratory: Negative for cough and shortness of breath. Cardiovascular: Positive for chest pain. Negative for palpitations and leg swelling. Gastrointestinal: Positive for abdominal pain and nausea. Negative for constipation, diarrhea and vomiting. Genitourinary: Negative for dysuria, frequency and urgency. Vaginal discharge, thick and clear   Skin: Negative for rash. Neurological: Negative for dizziness and headaches.        Past Medical History:   Diagnosis Date    Adenomatous polyp of colon 05/18/12    Alopecia, unspecified 02/14/08    Anemia     Asthma     Asthma     Blood transfusion abn reaction or complication, no procedure mishap     Cataracts, bilateral     Chest pain     Cholecystitis     Chronic back pain     Colorectal polyps     Constipation     Coronary stent occlusion 05/26/10    Cystic kidney disease 2008    Depression     Diabetes mellitus     Diabetes mellitus ([address]     Diarrhea     Emphysema     Exposure to communicable disease     Gastrointestinal ulcer     Gout     H/O seasonal allergies     Headache(784.0)     Heart disease     Heart murmur     Heartburn     History of unstable angina 02/17/12    HSV (herpes simplex virus) infection 2010    Hypercholesteremia     Hypertension     Hypokalemia 04/04/12    Kidney stone     Muscle atrophy     OA (osteoarthritis of spine)     Phlebitis     Pregnancy     Pulmonary hypertension ([address]     Reflux     Renal artery stenosis (HCC)     Renal insufficiency     Rheumatic fever     Rheumatoid arthritis(714.0)     Sleep apnea 2007    Stomach ulcer     Systemic lupus erythematosus ([facility] [address] [facility]     Thyroid dysfunction     Tuberculosis of lung     Unspecified constipation 02/14/08    Unspecified hypothyroidism 07/01/10       Past Surgical History:   Procedure Laterality Date    CARDIAC CATHETERIZATION  1/19/2018         HX CHOLECYSTECTOMY      HX CORONARY ARTERY BYPASS GRAFT      HX CORONARY STENT PLACEMENT  2009    HX CORONARY STENT PLACEMENT  5/26/10    HX DILATION AND CURETTAGE  2008    HX ENDOSCOPY      HX HEART CATHETERIZATION  5/26/10    stent July 2019    HX HERNIA REPAIR      HX TONSIL AND ADENOIDECTOMY         Family History   Problem Relation Age of Onset    Ovarian Cancer Sister        Social History     Socioeconomic History    Marital status:      Spouse name: Not on file    Number of children: Not on file    Years of education: Not on file    Highest education level: Not on file   Tobacco Use    Smoking status: Former Smoker    Smokeless tobacco: Never Used   Substance and Sexual Activity    Alcohol use: No    Drug use: No    Sexual activity: Yes       Allergies   Allergen Reactions    Latex Other (comments)    Propoxyphene N-Acetaminophen Other (comments)    Seafood [Iodine And Iodide Containing Products] Hives and Swelling    Shellfish Derived Other (comments)    Ace Inhibitors Other (comments)     Lotensin    Adhesive Other (comments)    Dexamethasone Diarrhea and Nausea and Vomiting    Iodine Other (comments)    Levofloxacin Hives, Itching and Swelling     Trouble breathing    Metronidazole Other (comments)    Niaspan [Niacin] Other (comments)     flushing    Reglan [Metoclopramide] Other (comments)       Prior to Admission Medications   Prescriptions Last Dose Informant Patient Reported? Taking?    DULoxetine (CYMBALTA) 60 mg capsule 8/20/2019 at Unknown time  No Yes   Sig: Take 1 Cap by mouth daily. albuterol (PROVENTIL HFA, VENTOLIN HFA, PROAIR HFA) 90 mcg/actuation inhaler Unknown at Unknown time  No No   Sig: Take 2 Puffs by inhalation every four (4) hours as needed for Wheezing. allopurinol (ZYLOPRIM) 100 mg tablet 8/20/2019 at Unknown time Self Yes Yes   Sig: Take 100 mg by mouth daily. ascorbic acid, vitamin C, (VITAMIN C) 500 mg tablet 7/21/2019 at Unknown time Self Yes Yes   Sig: Take 500 mg by mouth daily. aspirin 81 mg tablet 8/20/2019 at Unknown time Self Yes Yes   Sig: Take 81 mg by mouth daily. atorvastatin (LIPITOR) 80 mg tablet 8/14/2019 at Unknown time Self Yes Yes   Sig: Take 80 mg by mouth daily. carvedilol (COREG) 6.25 mg tablet 8/20/2019 at Unknown time  No Yes   Sig: Take 1 Tab by mouth two (2) times daily (with meals). clopidogrel (PLAVIX) 75 mg tab 8/20/2019 at Unknown time Self Yes Yes   Sig: Take 75 mg by mouth daily. famotidine (PEPCID) 40 mg tablet 8/20/2019 at Unknown time  No Yes   Sig: Take 1 Tab by mouth daily. ferrous sulfate 325 mg (65 mg iron) tablet 7/21/2019 at Unknown time Self Yes Yes   Sig: Take 325 mg by mouth Daily (before breakfast). fluticasone furoate-vilanterol (BREO ELLIPTA) 100-25 mcg/dose inhaler   No No   Sig: Take 1 Puff by inhalation daily. folic acid (FOLVITE) 1 mg tablet 8/14/2019 at Unknown time Self Yes Yes   Sig: Take 1 mg by mouth daily. furosemide (LASIX) 40 mg tablet 8/20/2019 at Unknown time  No Yes   Sig: Take 1 Tab by mouth two (2) times a day. At 8am and 2pm   gabapentin (NEURONTIN) 600 mg tablet 8/20/2019 at Unknown time Self Yes Yes   Sig: Take 600 mg by mouth three (3) times daily. glimepiride (AMARYL) 4 mg tablet 8/20/2019 at Unknown time Self Yes Yes   Sig: Take  by mouth two (2) times a day.    glucose blood VI test strips (ASCENSIA AUTODISC VI, ONE TOUCH ULTRA TEST VI) strip 8/19/2019 at Unknown time Self Yes Yes   Sig: by Does Not Apply route See Admin Instructions. guaiFENesin (ROBITUSSIN) 100 mg/5 mL liquid 2019 at Unknown time  No Yes   Sig: Take 5 mL by mouth every four (4) hours as needed for Cough. insulin NPH/insulin regular (HUMULIN 70/30 U-100 INSULIN) 100 unit/mL (70-30) injection 2019 at Unknown time Self Yes Yes   Sig: 10-15 Units by SubCUTAneous route two (2) times a day. isosorbide mononitrate ER (IMDUR) 120 mg CR tablet 2019 at Unknown time Self Yes Yes   Sig: Take 120 mg by mouth every morning. lactobacillus acidophilus (BACID) cap Unknown at Unknown time Self Yes No   Sig: Take 2 Caps by mouth daily. levothyroxine (SYNTHROID) 88 mcg tablet 2019 at Unknown time Self Yes Yes   Sig: Take 88 mcg by mouth Daily (before breakfast). losartan (COZAAR) 50 mg tablet 2019 at Unknown time Self No Yes   Sig: Take 1 Tab by mouth daily. nitroglycerin (NITROSTAT) 0.4 mg SL tablet 2019 at Unknown time Self No Yes   Si Tab by SubLINGual route as needed for Chest Pain (x3). nystatin (MYCOSTATIN) powder 2019 at Unknown time  No Yes   Sig: Apply  to affected area two (2) times a day. potassium chloride SR (KLOR-CON 10) 10 mEq tablet 2019 at Unknown time Self Yes Yes   Sig: Take 10 mEq by mouth daily. ranolazine ER (RANEXA) 1,000 mg 2019 at Unknown time Self No Yes   Sig: Take 1 Tab by mouth two (2) times a day.       Facility-Administered Medications: None       Physical Exam:     Patient Vitals for the past 24 hrs:   Temp Pulse Resp BP SpO2   19 0720 98.4 °F (36.9 °C) 81 16 141/73 96 %   19 0405 97.7 °F (36.5 °C) 77 17 149/79 99 %   19 2338 99 °F (37.2 °C) 79 16 120/75 99 %   19 2145 98.9 °F (37.2 °C) 72 11 132/74 99 %   19 2130  69 (!) 4 160/77 100 %   19 2115  73 (!) 7 155/82 98 %   195  68 13 149/81 100 %   19  73 15 133/73 100 %   19  76 13 121/82 100 %   19  72 20 (!) 149/93 100 %   19 1945    152/87 100 %   08/20/19 1930  67 16 146/80 99 %   08/20/19 1915  69 14 138/79 100 %   08/20/19 1800  68 19 116/73 100 %       Physical Exam:   Physical Exam   Constitutional: No distress. HENT:   Head: Normocephalic and atraumatic. Mouth/Throat: Oropharynx is clear and moist. No oropharyngeal exudate. Left maxillary premolar, chipped tooth with caries. Overall poor dentition. No obvious abscess. Eyes: Conjunctivae are normal. No scleral icterus. Cardiovascular: Normal rate, regular rhythm and normal heart sounds. Pulmonary/Chest: Effort normal and breath sounds normal. No respiratory distress. She has no wheezes. Abdominal: Soft. She exhibits no distension. There is no tenderness. Musculoskeletal: She exhibits no edema. Neurological: She is alert. Skin: Skin is warm and dry. Nursing note and vitals reviewed. Chemistry Recent Labs     08/21/19  0444 08/20/19  1840   * 217*    141   K 3.5 5.0    105   CO2 31 27   BUN 15 14   CREA 1.10 1.04   CA 8.9 9.0   MG 2.0  --    PHOS 2.8  --    AGAP 5 9   BUCR 14 13   AP  --  117   TP  --  7.3   ALB  --  3.7   GLOB  --  3.6   AGRAT  --  1.0        CBC w/Diff Recent Labs     08/21/19  0444 08/20/19  1840   WBC 8.8 11.1   RBC 4.52 4.42   HGB 13.3 13.0   HCT 39.9 38.7    218   GRANS 68 83*   LYMPH 24 12*   EOS 1 1        Liver Enzymes Protein, total   Date Value Ref Range Status   08/20/2019 7.3 6.4 - 8.2 g/dL Final     Albumin   Date Value Ref Range Status   08/20/2019 3.7 3.4 - 5.0 g/dL Final     Globulin   Date Value Ref Range Status   08/20/2019 3.6 2.0 - 4.0 g/dL Final     A-G Ratio   Date Value Ref Range Status   08/20/2019 1.0 0.8 - 1.7   Final     AST (SGOT)   Date Value Ref Range Status   08/20/2019 46 (H) 10 - 38 U/L Final     Alk.  phosphatase   Date Value Ref Range Status   08/20/2019 117 45 - 117 U/L Final     Recent Labs     08/20/19  1840   TP 7.3   ALB 3.7   GLOB 3.6   AGRAT 1.0   SGOT 46*           Lactic Acid No results found for: LAC  No results for input(s): LAC in the last 72 hours. BNP No results found for: BNP, BNPP, XBNPT     Cardiac Enzymes Lab Results   Component Value Date/Time    CPK 93 08/21/2019 04:44 AM    CKMB 1.6 08/21/2019 04:44 AM    CKND1 1.7 08/21/2019 04:44 AM    TROIQ <0.02 08/21/2019 04:44 AM    TROIQ <0.02 08/20/2019 10:01 PM    TROIQ <0.02 08/20/2019 06:40 PM        Coagulation No results for input(s): PTP, INR, APTT in the last 72 hours. No lab exists for component: INREXT      Thyroid  Lab Results   Component Value Date/Time    TSH 3.410 11/13/2015 04:43 AM          Lipid Panel Lab Results   Component Value Date/Time    Cholesterol, total 214 (H) 01/20/2018 02:24 AM    HDL Cholesterol 35 (L) 01/20/2018 02:24 AM    LDL, calculated 130 01/20/2018 02:24 AM    Triglyceride 246 (H) 01/20/2018 02:24 AM    CHOL/HDL Ratio 6.1 (H) 01/20/2018 02:24 AM        ABG No results for input(s): PHI, PHI, POC2, PCO2I, PO2, PO2I, HCO3, HCO3I, FIO2, FIO2I in the last 72 hours. Urinalysis Lab Results   Component Value Date/Time    Color Yellow 03/11/2019 05:17 PM    Appearance Clear 03/11/2019 05:17 PM    Specific gravity 1.015 03/11/2019 05:17 PM    pH (UA) 6.0 03/11/2019 05:17 PM    Protein Negative 03/11/2019 05:17 PM    Glucose 250 (A) 03/11/2019 05:17 PM    Ketone Negative 03/11/2019 05:17 PM    Bilirubin Negative 03/11/2019 05:17 PM    Urobilinogen 0.2 03/11/2019 05:17 PM    Nitrites Negative 03/11/2019 05:17 PM    Leukocyte Esterase Trace (A) 03/11/2019 05:17 PM    WBC OCCASIONAL 03/11/2019 05:17 PM    RBC OCCASIONAL 03/11/2019 05:17 PM        Micro No results for input(s): SDES, CULT in the last 72 hours. No results for input(s): CULT in the last 72 hours. Imaging:  XR (Most Recent).  Results from Hospital Encounter encounter on 08/20/19   XR CHEST PA LAT    Narrative EXAM: CHEST PA AND LATERAL    CLINICAL HISTORY/INDICATION:  r/o pneumonia , dizziness, left-sided chest pain  radiating to the left neck and shoulder, associated with nausea onset 3 days ago  acutely worsening today     COMPARISON: None. TECHNIQUE: PA and lateral views     FINDINGS:      The cardiac and mediastinal silhouette is normal.  Sternal sutures and clips of  CABG are demonstrated. The lungs are clear. The costophrenic angles are  sharply defined. Pulmonary vascularity is normal. No bony abnormalities are  seen. Impression IMPRESSION:    Negative chest.        CT (Most Recent) Results from Hospital Encounter encounter on 03/11/19   CT CHEST WO CONT    Narrative EXAM: CT CHEST WO CONT    INDICATION: Hemoptysis; Pneumonia, unresolved. TECHNIQUE: CT scan of the chest with IV contrast including coronal and sagittal  images. DICOM format image data is available to non-affiliated external healthcare  facilities or entities on a secure, media free, reciprocally searchable basis  with patient authorization for 12 months following the date of the study. COMPARISON:   9/12/2018    FINDINGS:  Pulmonary arteries: Unremarkable. .  Aorta: No acute findings. No thoracic aortic aneurysm . Lungs: Patchy areas of groundglass opacity primarily involving the right upper  lobe. There are numerous small nodules primarily involving the right middle and  right lower lobes. There is also minimal patchy consolidation at the right  middle and right lower lobes. Atelectasis at both lung bases. Lymph nodes: Unchanged mediastinal lymphadenopathy. Esophagus: Normal  Pleural spaces: Trace right pleural effusion. Heart: Unremarkable. No cardiomegaly. No significant pericardial effusion. No  evidence of right ventricular dysfunction. Thyroid: Normal  Chest wall: No abnormal findings. Bones: No fractures identified. Upper abdomen: Normal      Impression IMPRESSION:    1. Patchy areas of groundglass opacity concentrated in the right upper lobe. 2. Numerous small nodule at the right middle and right lower lobes.   3. Minimal patchy consolidation at the right middle and right lower lobes. 4. Small right pleural effusion. 5. Unchanged mediastinal lymphadenopathy. 6. Findings again favor an inflammatory/infectious process of the right lung. ECHO No results found for this or any previous visit. EKG No results found for this or any previous visit.      Recent Results (from the past 12 hour(s))   TROPONIN I    Collection Time: 08/20/19 10:01 PM   Result Value Ref Range    Troponin-I, QT <0.02 0.0 - 0.045 NG/ML   GLUCOSE, POC    Collection Time: 08/21/19  1:00 AM   Result Value Ref Range    Glucose (POC) 214 (H) 70 - 110 mg/dL   CARDIAC PANEL,(CK, CKMB & TROPONIN)    Collection Time: 08/21/19  4:44 AM   Result Value Ref Range    CK 93 26 - 192 U/L    CK - MB 1.6 <3.6 ng/ml    CK-MB Index 1.7 0.0 - 4.0 %    Troponin-I, QT <0.02 0.0 - 1.419 NG/ML   METABOLIC PANEL, BASIC    Collection Time: 08/21/19  4:44 AM   Result Value Ref Range    Sodium 139 136 - 145 mmol/L    Potassium 3.5 3.5 - 5.5 mmol/L    Chloride 103 100 - 111 mmol/L    CO2 31 21 - 32 mmol/L    Anion gap 5 3.0 - 18 mmol/L    Glucose 108 (H) 74 - 99 mg/dL    BUN 15 7.0 - 18 MG/DL    Creatinine 1.10 0.6 - 1.3 MG/DL    BUN/Creatinine ratio 14 12 - 20      GFR est AA >60 >60 ml/min/1.73m2    GFR est non-AA 50 (L) >60 ml/min/1.73m2    Calcium 8.9 8.5 - 10.1 MG/DL   MAGNESIUM    Collection Time: 08/21/19  4:44 AM   Result Value Ref Range    Magnesium 2.0 1.6 - 2.6 mg/dL   PHOSPHORUS    Collection Time: 08/21/19  4:44 AM   Result Value Ref Range    Phosphorus 2.8 2.5 - 4.9 MG/DL   CBC WITH AUTOMATED DIFF    Collection Time: 08/21/19  4:44 AM   Result Value Ref Range    WBC 8.8 4.6 - 13.2 K/uL    RBC 4.52 4.20 - 5.30 M/uL    HGB 13.3 12.0 - 16.0 g/dL    HCT 39.9 35.0 - 45.0 %    MCV 88.3 74.0 - 97.0 FL    MCH 29.4 24.0 - 34.0 PG    MCHC 33.3 31.0 - 37.0 g/dL    RDW 14.4 11.6 - 14.5 %    PLATELET 938 492 - 327 K/uL    MPV 11.4 9.2 - 11.8 FL    NEUTROPHILS 68 40 - 73 %    LYMPHOCYTES 24 21 - 52 %    MONOCYTES 7 3 - 10 %    EOSINOPHILS 1 0 - 5 %    BASOPHILS 0 0 - 2 %    ABS. NEUTROPHILS 5.9 1.8 - 8.0 K/UL    ABS. LYMPHOCYTES 2.1 0.9 - 3.6 K/UL    ABS. MONOCYTES 0.6 0.05 - 1.2 K/UL    ABS. EOSINOPHILS 0.1 0.0 - 0.4 K/UL    ABS. BASOPHILS 0.0 0.0 - 0.1 K/UL    DF AUTOMATED     GLUCOSE, POC    Collection Time: 08/21/19  6:29 AM   Result Value Ref Range    Glucose (POC) 138 (H) 70 - 110 mg/dL   GLUCOSE, POC    Collection Time: 08/21/19  7:29 AM   Result Value Ref Range    Glucose (POC) 119 (H) 70 - 110 mg/dL       Assessment/Plan:   79 y.o. female with PMH end stage CAD, ischemic cardiomyopathy, PAD s/p AKA, , now admitted with chest pain for ACS r/o given extensive cardiac history below. Extensive history below taken from cardiology notes:  1. CABG x5 1999   2. multivessel stenting of LAD x3 ROSA, RCA x2 ROSA, and sequential SVG x1 ORSA  3. Cath (11/2012) -- occluded RCA and Cx, LAD patent stents, LCX R-L collaterals, Left radial graft to OM-1 and OM-2 occluded, seqSVG-acute marginal/dRCA/PDA with 50% stenosis  4. NST 4/2015 - mild lateral ischemia and normal EF 60%  5. LHC 11/17/15 (Porter) - occluded Cx, known occluded seq radial -OM1/OM2, occluded native RCA, patent stent LAD, 60-70% mid LAD, 70% far distal LAD, 90% SVG - dRCA to first distal anastomosis with 90% focal ISR in RPDA. s/p PCI x 2 to R Coronary system (feeding R PDA and some collateralization to circ) with 3.0x18mm Xience ROSA to SVG lesion and 2.5x15mm Xience ROSA to PDA lesion with in stent restenosis  6. Recurrent CP postcath, repeat LHC 11/20/2015 (Cilydiao):  areas of ISR s/p angioplasty patent  7.  LHC (Bobby) 6/13/16 - LM nl, LAD diffuse 30-60% mid, distal 60%, apical LAD small caliber 70-90%, LCx occluded prox, RCA occluded prox, SVG to R PDA and RV marginal focal 90% just distal to RV marginal touchdown, R PDA prox 30% stenosis focal mid vessel 60% (both in stent) s/p successful PCI of SVG to R PDA using 3.0x22mm Resolute Integrity ROSA  8. C 1/23/17 (Danny) -- LM wnl, LAD 70% prox, 50-60% ISR in mid LAD, distal LAD with severe diffuse 60-90%, apical LAD diffuse , LCx occluded proximally, RCA occluded proximally, free radial graft to OM1 and 2 not imaged (known occluded),  SVG to R PDA and R PLB patent with supplied R PLB with focal 50-60% ISR unchanged from prior films, other stents widely patent s/p PCI of distal LAD using [id number] Synergy ROSA, and PCI of prox and mid LAD using 3.0x28mm Synergy ROSA   9. C 9/13/2017:  S/p PCI x 2 SVG to RCA 2.5x24mm and 2.5x12mm Synergy ROSA  10. NSTEMI s/p cath 1/19/18 - s/p LAD ISRS with 3.0 x 16 mm Synergy ROSA, L radial graft to OM1-OM2 (known to be occluded)--residual 40% proximal SVG to R PDA stenosis  11. NST 2/5/18: small, mild mixed scar and ischemia in inferolateral wall, EF 51% with global hypokinesis   12. Cath 2/26/18 ([facility]) - total occlusion of native RCA and LCx, known occlusion of seq graft to OMs, no sig LM disease, 40-50% ISR of mid-distal LAD, new ostial 80% SVG-RCA, LCs collateralized from both SVG-RCA and from LAD, s/p PCI of ostial SVG-RCA. continued on brilinta, asa  13. [facility] 4/10/18 ([facility]) - severe 3 VCAD with LAD with extensive stenting, 50% end of stent; occluded stents to circ with L>L collaterals into the OM; occluded RCA; 60-70% ostial narrowing of the SVG-PDA  --- 70% ostial stenosis of the RCA vein graft to no residual using a 3.0 x 12 Synergy stent   14. [facility] 4/20/18 (Stella King) - severe multivessel native CAD including total occlusion of prox RCA and LCx. Mod to severe ISR of the distal LAD, mod ISR of the SVG to RCA with known total occlusion of additional grafts.  Severe RLE PVD with limited angiography. REC: extensive severe multivessel disease with a lone remaining graft and NO reasonable target for PCI -- Medical treatment.   13. NSTEMI s/p cath 9/17/18 (at [facility]) - instent restenosis of SVG to RCA treated with 2.5x10mm Angiosculpt cutting balloon  - S/p R radial artery occlusion noted on PVL 9/25/18, managed conservatively per Vascular  16. UA s/p cath 11/19/18 Kieran Head): Totally occluded SVG to RCA s/p PCI (3.0 x 28 mm Xience ROSA) recommend triple AC with ASA, Eliquis, and Plavix  17. NSTEMI s/p cath 1/9/19 Chris Jeffrey):  Progressive disease of prox to mid LAD with 90% stenosis, treated with angiosculpt 3.0mm PTCA balloon.  RCA and LCx both with prox chronic 100% total occlusion. Patent SVT to PDA and PLB. Mid body of graft contains eccentric 50% stenosis. prox stent contains 20% stenosis -- started on Eliquis 2.5mg BID for CAD  18. NSTEMI & LHC 3/2619 [facility]): 99% LAD s/p robotic PCI with placement of ROSA -- Eliquis stopped due to hemoptysis/epistaxis  19. Cardiac cath 4/11/2019: three-vessel coronary artery disease with occluded circumflex marginal with minimal collateral flow. She was noted to have a mild to moderate in-stent restenosis of the mid to distal LAD. She had patent vein graft to the LAD. Her left ventricular end-diastolic pressure was elevated at 28 mmHg. Ischemic cardiomyopathy:   1. Echo 1/19/18 - EF 41%, Basal-mid inferior wall and inferolateral wall hypokinetic, PAP 23 mmHg   2. Echo 9/12/18 - EF 44%  3. Echo 11/14/18 - EF 35%, WMAs noted, hypokinesis of inferolateral, basal to mid inferior, basal to mid inferoseptal wall segments, nearly akinetic basal inferior and inferolateral segments, grade III diastolic dysfunction  4. Echo 2/21/19 - EF 45-50%  5. Echo 3/27/19 - EF 32% w/ severe hypokinesis of inferolateral, apical septal and anteroseptal walls, akinesis of distal inferoseptal wall  6. Echo 4/9/19 - EF 42%, Mild LVH, Trace TR, PAP 30      Chest pain with H/o End stage CAD, multivessel stenting, NSTEMI x4, ischemic cardiomyopathy, Chronic systolic heart failure  See history as above. Most recent [facility] 7/17/19:  1. Left main coronary artery: patent  2. Left anterior descending coronary artery: extensive prior stents in the LAD.  Essentially from mid to distal and small apical stent. There is diffuse instent re-stenosis. This is at worst 50% in distal LAD. 3. Left circumflex coronary artery: non dominant, 100% occluded  4. Right coronary artery: dominant 100% occluded  5. SVG to RCA- severe ostial instent restenosis 80%  PCI 7/17/19:  Successful PCI of SVG-RCA ostia with drug eluding stent. - Cardiology to see, appreciate recommendations  - Continue home eliquis 5 mg q12 hours, asa 81 mg every other day (h/o nosebleeds), coreg 6.25 mg BID, plavix 75 mg daily, furosemide 40 mg BID, ranexa 1000 mg [id number]    Dental Caries with possible dental abscess:  Left maxillary premolar with broken tooth and dental caries. Overall poor dentition.   - Will give Augmentin for 5 days.   - Oral viscous lidocaine for pain. - Will need f/u with dentist for tooth extraction.     Vaginal discharge:  Vaginal discharge is thick and clear. Endorses h/o prior when she takes antibiotics. She was evaluated in the office prior to admission. Outpatient record said NuSwab ordered, but patient states no swab was done. - Will treat with fluconazole once. Told her if she still has symptoms in 1 week. Take another dose. She might still have symptoms given she needs to continue antibiotics for 5 days for tooth infection.       HTN, HLD  Blood pressure stable. - Continue home imdur 120 mg TID, losartan 50 mg daily  - Continue home atorvastatin 80 mg daily    Normocytic anemia, with h/o GI bleed 2012 and 2013  Likely iron deficiency given iron studies. Last iron studies 9/11/2018: iron 26, TIBC 415, Ferritin 10.3  - Continue home ferrous sulfate 325 mg daily    DM type 2  Last Hgb T5YPegq 2019 8.4   On home novolin 70/30 10 U twice daily with meals. Will hold here. - Accuchecks, SSI    Asthma, ALDEN not on CPAP, h/o tobacco use  Stable. On home breo ellipta and proventil. Will hold.    - Duo-neb as needed    Gout- Stable, continue home allopurinol 100 mg daily    Hypothyroidism  TSH 0.72 may 2019  - Continue home levothyroxine 88 mg daily            Diet cardiac   DVT Prophylaxis Asa, plavix, eliquis   GI Prophylaxis n/a   Code status FULL   Disposition home     Point of Contact [place]  Relationship: daughter  [phone]        Isha Vegas MD , PGY-1   [address]   Intern Pager: [phone]   August 21, 2019, 9:29 AM

## 2019-08-21 NOTE — ROUTINE PROCESS
[id number] assumed care of pt after bedside verbal report was given by off going nurse, pt resting in bed awake, no acute distress noted, will monitor

## 2019-08-21 NOTE — MED STUDENT NOTES
[facility]  [facility]      Patient: Salima Brown MRN: [id number]  CSN: [id number]    YOB: 1950  Age: 79 y.o. Sex: female      DOA: 8/20/2019       HPI:     Salima Brown is a 79 y.o. female with PMH of ischemic cardiomyopathy and end stage coronary artery disease, five coronary bypasses, s/p 30 stents, hypertension, and diabetes mellitus now presenting with complaint of chest pain. The chest began yesterday morning as soon as she woke up. She describes the pain as initially achy but now more of a tightness. She rates the pain a 7 out of 10. The pain is over the left precordium and radiates to the left nape, left shoulder, and left arm. She has had these episodes of chest pain in the past and feels they occur randomly. Nitroglycerin improves her pain. She also mentions abdominal pain which began yesterday. She describes a feeling of fullness and felt a shift in her stomach when she had her last bowel movement. Her last bowel movement was yesterday but usually it is daily. Ms. Reed Serrano has also had pain in her left upper premolar that radiates to her left cheek. The pain began two weeks ago. It is located at the gumline where she has had a prior filling. Her filling in the area has broken off twice since and she has one piece remaining. She went to the dentist for her pain who prescribed her antibiotics for her abscess. She has stopped taking the antibiotics because she had been feeling poorly and now has a yeast infection. ED Course: Ms. Reed Serrano visited her physician earlier today (8/21/19) for her yeast infection. EKG was conducted was notable for ischemic changes. She was referred to the ED and then accepted for admission to followup on the ischemic changes on EKG. ROS   General: Positive for fatigue, dizziness, nausea. CV: Positive for chest pain that radiates to arm and neck. Respiratory: Negative for shortness of breath.   Abdominal: Positive for left upper quadrant pain. MSK: Negative for swelling of extremities.       Past Medical History:   Diagnosis Date    Adenomatous polyp of colon 05/18/12    Alopecia, unspecified 02/14/08    Anemia     Asthma     Asthma     Blood transfusion abn reaction or complication, no procedure mishap     Cataracts, bilateral     Chest pain     Cholecystitis     Chronic back pain     Colorectal polyps     Constipation     Coronary stent occlusion 05/26/10    Cystic kidney disease 2008    Depression     Diabetes mellitus     Diabetes mellitus ([facility] [address]     Diarrhea     Emphysema     Exposure to communicable disease     Gastrointestinal ulcer     Gout     H/O seasonal allergies     Headache(784.0)     Heart disease     Heart murmur     Heartburn     History of unstable angina 02/17/12    HSV (herpes simplex virus) infection 2010    Hypercholesteremia     Hypertension     Hypokalemia 04/04/12    Kidney stone     Muscle atrophy     OA (osteoarthritis of spine)     Phlebitis     Pregnancy     Pulmonary hypertension (HCC)     Reflux     Renal artery stenosis (HCC)     Renal insufficiency     Rheumatic fever     Rheumatoid arthritis(714.0)     Sleep apnea 2007    Stomach ulcer     Systemic lupus erythematosus ([facility] [address]     Thyroid dysfunction     Tuberculosis of lung     Unspecified constipation 02/14/08    Unspecified hypothyroidism 07/01/10       Past Surgical History:   Procedure Laterality Date    CARDIAC CATHETERIZATION  1/19/2018         HX CHOLECYSTECTOMY      HX CORONARY ARTERY BYPASS GRAFT      HX CORONARY STENT PLACEMENT  2009    HX CORONARY STENT PLACEMENT  5/26/10    HX DILATION AND CURETTAGE  2008    HX ENDOSCOPY      HX HEART CATHETERIZATION  5/26/10    stent July 2019    HX HERNIA REPAIR      HX TONSIL AND ADENOIDECTOMY         Family History   Problem Relation Age of Onset    Ovarian Cancer Sister        Social History     Socioeconomic History    Marital status:      Spouse name: Not on file    Number of children: Not on file    Years of education: Not on file    Highest education level: Not on file   Tobacco Use    Smoking status: Former Smoker    Smokeless tobacco: Never Used   Substance and Sexual Activity    Alcohol use: No    Drug use: No    Sexual activity: Yes   Diet consists of cereal and sandwiches. Unable to walk much due to shortness of breath. Allergies   Allergen Reactions    Latex Other (comments)    Propoxyphene N-Acetaminophen Other (comments)    Seafood [Iodine And Iodide Containing Products] Hives and Swelling    Shellfish Derived Other (comments)    Ace Inhibitors Other (comments)     Lotensin    Adhesive Other (comments)    Dexamethasone Diarrhea and Nausea and Vomiting    Iodine Other (comments)    Levofloxacin Hives, Itching and Swelling     Trouble breathing    Metronidazole Other (comments)    Niaspan [Niacin] Other (comments)     flushing    Reglan [Metoclopramide] Other (comments)       Prior to Admission Medications   Prescriptions Last Dose Informant Patient Reported? Taking? DULoxetine (CYMBALTA) 60 mg capsule 8/20/2019 at Unknown time  No Yes   Sig: Take 1 Cap by mouth daily. albuterol (PROVENTIL HFA, VENTOLIN HFA, PROAIR HFA) 90 mcg/actuation inhaler Unknown at Unknown time  No No   Sig: Take 2 Puffs by inhalation every four (4) hours as needed for Wheezing. allopurinol (ZYLOPRIM) 100 mg tablet 8/20/2019 at Unknown time Self Yes Yes   Sig: Take 100 mg by mouth daily. ascorbic acid, vitamin C, (VITAMIN C) 500 mg tablet 7/21/2019 at Unknown time Self Yes Yes   Sig: Take 500 mg by mouth daily. aspirin 81 mg tablet 8/20/2019 at Unknown time Self Yes Yes   Sig: Take 81 mg by mouth daily. atorvastatin (LIPITOR) 80 mg tablet 8/14/2019 at Unknown time Self Yes Yes   Sig: Take 80 mg by mouth daily.    carvedilol (COREG) 6.25 mg tablet 8/20/2019 at Unknown time  No Yes   Sig: Take 1 Tab by mouth two (2) times daily (with meals). clopidogrel (PLAVIX) 75 mg tab 8/20/2019 at Unknown time Self Yes Yes   Sig: Take 75 mg by mouth daily. famotidine (PEPCID) 40 mg tablet 8/20/2019 at Unknown time  No Yes   Sig: Take 1 Tab by mouth daily. ferrous sulfate 325 mg (65 mg iron) tablet 7/21/2019 at Unknown time Self Yes Yes   Sig: Take 325 mg by mouth Daily (before breakfast). fluticasone furoate-vilanterol (BREO ELLIPTA) 100-25 mcg/dose inhaler   No No   Sig: Take 1 Puff by inhalation daily. folic acid (FOLVITE) 1 mg tablet 8/14/2019 at Unknown time Self Yes Yes   Sig: Take 1 mg by mouth daily. furosemide (LASIX) 40 mg tablet 8/20/2019 at Unknown time  No Yes   Sig: Take 1 Tab by mouth two (2) times a day. At 8am and 2pm   gabapentin (NEURONTIN) 600 mg tablet 8/20/2019 at Unknown time Self Yes Yes   Sig: Take 600 mg by mouth three (3) times daily. glimepiride (AMARYL) 4 mg tablet 8/20/2019 at Unknown time Self Yes Yes   Sig: Take  by mouth two (2) times a day. glucose blood VI test strips (ASCENSIA AUTODISC VI, ONE TOUCH ULTRA TEST VI) strip 8/19/2019 at Unknown time Self Yes Yes   Sig: by Does Not Apply route See Admin Instructions. guaiFENesin (ROBITUSSIN) 100 mg/5 mL liquid 8/14/2019 at Unknown time  No Yes   Sig: Take 5 mL by mouth every four (4) hours as needed for Cough. insulin NPH/insulin regular (HUMULIN 70/30 U-100 INSULIN) 100 unit/mL (70-30) injection 8/14/2019 at Unknown time Self Yes Yes   Sig: 10-15 Units by SubCUTAneous route two (2) times a day. isosorbide mononitrate ER (IMDUR) 120 mg CR tablet 8/20/2019 at Unknown time Self Yes Yes   Sig: Take 120 mg by mouth every morning. lactobacillus acidophilus (BACID) cap Unknown at Unknown time Self Yes No   Sig: Take 2 Caps by mouth daily. levothyroxine (SYNTHROID) 88 mcg tablet 8/20/2019 at Unknown time Self Yes Yes   Sig: Take 88 mcg by mouth Daily (before breakfast).    losartan (COZAAR) 50 mg tablet 8/20/2019 at Unknown time Self No Yes   Sig: Take 1 Tab by mouth daily. nitroglycerin (NITROSTAT) 0.4 mg SL tablet 2019 at Unknown time Self No Yes   Si Tab by SubLINGual route as needed for Chest Pain (x3). nystatin (MYCOSTATIN) powder 2019 at Unknown time  No Yes   Sig: Apply  to affected area two (2) times a day. potassium chloride SR (KLOR-CON 10) 10 mEq tablet 2019 at Unknown time Self Yes Yes   Sig: Take 10 mEq by mouth daily. ranolazine ER (RANEXA) 1,000 mg 2019 at Unknown time Self No Yes   Sig: Take 1 Tab by mouth two (2) times a day. Facility-Administered Medications: None       Physical Exam:     Patient Vitals for the past 24 hrs:   Temp Pulse Resp BP SpO2   19 1111 98.4 °F (36.9 °C) 70 16 134/79 97 %   19 0720 98.4 °F (36.9 °C) 81 16 141/73 96 %   19 0405 97.7 °F (36.5 °C) 77 17 149/79 99 %   19 2338 99 °F (37.2 °C) 79 16 120/75 99 %   19 2145 98.9 °F (37.2 °C) 72 11 132/74 99 %   19 2130  69 (!) 4 160/77 100 %   19 2115  73 (!) 7 155/82 98 %   19 2045  68 13 149/81 100 %   19 2030  73 15 133/73 100 %   19  76 13 121/82 100 %   19  72 20 (!) 149/93 100 %   19 1945    152/87 100 %   19 1930  67 16 146/80 99 %   19 1915  69 14 138/79 100 %   19 1800  68 19 116/73 100 %       Physical Exam  General: Awake, alert, responsive  Head: Broken filling and black ovoid abscess on upper left premolar. CV: Regular rate and rhythm, no murmurs. 2+ radial and pedal pulses symmetrically. No jugular veinous distension. Respiratory: Lungs clear to auscultation bilaterally. Unlabored breathing. Abdominal: Abdomen soft, non-distended, and non-tender to palpation. MSK: No pitting edema on bilaterally distal extremities.     IMAGING: CXR from 2019 was negative with normal cardiac and mediastinal silhouette, clear lung, sharply defined costophrenic angle, normal pulmonary vascularity, and no bone abnormalities. Recent Results (from the past 12 hour(s))   CARDIAC PANEL,(CK, CKMB & TROPONIN)    Collection Time: 08/21/19  4:44 AM   Result Value Ref Range    CK 93 26 - 192 U/L    CK - MB 1.6 <3.6 ng/ml    CK-MB Index 1.7 0.0 - 4.0 %    Troponin-I, QT <0.02 0.0 - 5.937 NG/ML   METABOLIC PANEL, BASIC    Collection Time: 08/21/19  4:44 AM   Result Value Ref Range    Sodium 139 136 - 145 mmol/L    Potassium 3.5 3.5 - 5.5 mmol/L    Chloride 103 100 - 111 mmol/L    CO2 31 21 - 32 mmol/L    Anion gap 5 3.0 - 18 mmol/L    Glucose 108 (H) 74 - 99 mg/dL    BUN 15 7.0 - 18 MG/DL    Creatinine 1.10 0.6 - 1.3 MG/DL    BUN/Creatinine ratio 14 12 - 20      GFR est AA >60 >60 ml/min/1.73m2    GFR est non-AA 50 (L) >60 ml/min/1.73m2    Calcium 8.9 8.5 - 10.1 MG/DL   MAGNESIUM    Collection Time: 08/21/19  4:44 AM   Result Value Ref Range    Magnesium 2.0 1.6 - 2.6 mg/dL   PHOSPHORUS    Collection Time: 08/21/19  4:44 AM   Result Value Ref Range    Phosphorus 2.8 2.5 - 4.9 MG/DL   CBC WITH AUTOMATED DIFF    Collection Time: 08/21/19  4:44 AM   Result Value Ref Range    WBC 8.8 4.6 - 13.2 K/uL    RBC 4.52 4.20 - 5.30 M/uL    HGB 13.3 12.0 - 16.0 g/dL    HCT 39.9 35.0 - 45.0 %    MCV 88.3 74.0 - 97.0 FL    MCH 29.4 24.0 - 34.0 PG    MCHC 33.3 31.0 - 37.0 g/dL    RDW 14.4 11.6 - 14.5 %    PLATELET 063 949 - 574 K/uL    MPV 11.4 9.2 - 11.8 FL    NEUTROPHILS 68 40 - 73 %    LYMPHOCYTES 24 21 - 52 %    MONOCYTES 7 3 - 10 %    EOSINOPHILS 1 0 - 5 %    BASOPHILS 0 0 - 2 %    ABS. NEUTROPHILS 5.9 1.8 - 8.0 K/UL    ABS. LYMPHOCYTES 2.1 0.9 - 3.6 K/UL    ABS. MONOCYTES 0.6 0.05 - 1.2 K/UL    ABS. EOSINOPHILS 0.1 0.0 - 0.4 K/UL    ABS.  BASOPHILS 0.0 0.0 - 0.1 K/UL    DF AUTOMATED     GLUCOSE, POC    Collection Time: 08/21/19  6:29 AM   Result Value Ref Range    Glucose (POC) 138 (H) 70 - 110 mg/dL   GLUCOSE, POC    Collection Time: 08/21/19  7:29 AM   Result Value Ref Range    Glucose (POC) 119 (H) 70 - 110 mg/dL   EKG, 12 LEAD, SUBSEQUENT    Collection Time: 08/21/19 10:35 AM   Result Value Ref Range    Ventricular Rate 75 BPM    Atrial Rate 75 BPM    P-R Interval 204 ms    QRS Duration 90 ms    Q-T Interval 412 ms    QTC Calculation (Bezet) 460 ms    Calculated P Axis 42 degrees    Calculated R Axis -39 degrees    Calculated T Axis 126 degrees    Diagnosis       Normal sinus rhythm  Left axis deviation  Left ventricular hypertrophy with repolarization abnormality  Inferior infarct , age undetermined  Abnormal ECG  When compared with ECG of 20-AUG-2019 18:01,  No significant change was found     GLUCOSE, POC    Collection Time: 08/21/19 11:10 AM   Result Value Ref Range    Glucose (POC) 251 (H) 70 - 110 mg/dL         Assessment/Plan:   79 y.o. female with PMH of ischemic cardiomyopathy and end stage coronary artery disease, five coronary bypasses, s/p 30 stents, hypertension, and diabetes mellitus, now admitted with with complaint of chest pain. The differential diagnosis for her chest pain includes:  1. Stable angina: The patient has end stage coronary artery disease thus stable angina is very likely. Response to nitroglycerin also indicates possible stable angina. 2. Unstable angina: Ischemic EKG changes, onset of symptoms at rest, and history of end stage coronary artery disease may indicate unstable angina with incomplete coronary artery thrombosis without troponin-releasing infarction. Response of pain to nitroglycerin makes unstable angina somewhat less likely. 3. Prinzmetal angina: Given the occurrence of symptoms at rest, cardiac stress secondary to coronary artery vasospasm is possible. Such would cause ischemic changes shown on earlier EKG episodically. The consistency of the chest makes such less likely. 4. Myocardial infarction is a possibility with symptoms of chest pain that radiates to the arm and neck as well history of end stage coronary put the patient at risk of myocardial infarction.  As both troponin tests have come back negative, this is not likely. The differential for left upper quadrant abdominal pain includes:  1. Referred cardiac pain gives the onset of pain alongside the chest pain. 2. Constipation as the patient has had less water intake in the last two days and felt movement of a mass during her last bowel movement. 3. Diverticulitis given the patient's age group. Such is less likely due to the location of the pain and lack of fever. 4. Splenic trauma but this is very unlikely due to no history of trauma. The patient is hyperglycemic (251) which indicates poorly controlled diabetes mellitus. Given the patient's end stage coronary artery disease, such level of hyperglycemia can increase risk of coronary artery disease and subsequent cardiac events. Plan:  Chest pain: Catheterization may be considered to find any atherosclerotic plaque causing angina or partially-occluding thrombus causing unstable angina. However, stenting may be contraindicated as data shows repeated stents are associated with poorer outcomes. As the patient is responsive to nitroglycerin, consider medical management with drugs which reduces the workload of the heart including beta blockers and long acting nitrates (e.g. isosorbide mononitrate). A calcium channel blocker may be considered to determine and even treat any Prinzmetal angina. Repeat EKG every 6 hours to monitor for any further ischemic changes. Abdominal pain: The abdominal pain may be secondary to the cardiac pain and thus may improve with improvement of cardiac symptoms with aforementioned treatments. The pain may also be caused by constipation and thus a laxative such as Miralax may be considered. Tooth pain: There is an abscess which requires followup with the dentist for possible debridement. Encourage patient to take her prescribed antibiotics to prevent further worsening of the abscess until her dentist followup.      Yeast infection: Prescribe 1 dose 150 mg oral fluconazole. Diet: Solids and liquids  DVT Prophylaxis: Unknown  Code Status: FULL  Point of Contact: Daughter    Disposition and anticipated LOS: Patient is stable. Troponins remain negative. Anticipated LOS until 8/22/19.     [address] [facility] MS3

## 2019-08-21 NOTE — ROUTINE PROCESS
Bedside and Verbal shift change report given to Haja Laguerre RN (oncoming nurse) by Sp Monsivais RN (offgoing nurse). Report included the following information SBAR, Kardex and MAR.

## 2019-08-21 NOTE — DISCHARGE INSTRUCTIONS
Patient Education        Learning About Coronary Artery Disease (CAD)  What is coronary artery disease? Coronary artery disease (CAD) occurs when plaque builds up in the arteries that bring oxygen-rich blood to your heart. Plaque is a fatty substance made of cholesterol, calcium, and other substances in the blood. This process is called hardening of the arteries, or atherosclerosis. What happens when you have coronary artery disease? · Plaque may narrow the coronary arteries. Narrowed arteries cause poor blood flow. This can lead to angina symptoms such as chest pain or discomfort. If blood flow is completely blocked, you could have a heart attack. · You can slow CAD and reduce the risk of future problems by making changes in your lifestyle. These include quitting smoking and eating heart-healthy foods. · Treatments for CAD, along with changes in your lifestyle, can help you live a longer and healthier life. How can you prevent coronary artery disease? · Do not smoke. It may be the best thing you can do to prevent heart disease. If you need help quitting, talk to your doctor about stop-smoking programs and medicines. These can increase your chances of quitting for good. · Be active. Get at least 30 minutes of exercise on most days of the week. Walking is a good choice. You also may want to do other activities, such as running, swimming, cycling, or playing tennis or team sports. · Eat heart-healthy foods. Eat more fruits and vegetables and less foods that contain saturated and trans fats. Limit alcohol, sodium, and sweets. · Stay at a healthy weight. Lose weight if you need to. · Manage other health problems such as diabetes, high blood pressure, and high cholesterol. · If you have talked about it with your doctor, take a low-dose aspirin every day. Aspirin can help certain people lower their risk of a heart attack or stroke.  But taking aspirin isn't right for everyone, because it can cause serious bleeding. Do not start taking daily aspirin unless your doctor knows about it. How is coronary artery disease treated? · Your doctor will suggest that you make lifestyle changes. For example, your doctor may ask you to eat healthy foods, quit smoking, lose extra weight, and be more active. · You will have to take medicines. · Your doctor may suggest a procedure to open narrowed or blocked arteries. This is called angioplasty. Or your doctor may suggest using healthy blood vessels to create detours around narrowed or blocked arteries. This is called bypass surgery. Follow-up care is a key part of your treatment and safety. Be sure to make and go to all appointments, and call your doctor if you are having problems. It's also a good idea to know your test results and keep a list of the medicines you take. Where can you learn more? Go to http://chasity-ashely.info/. Enter (23) [phone] in the search box to learn more about \"Learning About Coronary Artery Disease (CAD). \"  Current as of: July 22, 2018  Content Version: 12.1  © 5212-6405 [employer]. Care instructions adapted under license by [employer] (which disclaims liability or warranty for this information). If you have questions about a medical condition or this instruction, always ask your healthcare professional. [address] any warranty or liability for your use of this information. Patient Education        Angina: Care Instructions  Your Care Instructions    You have a problem called angina. Angina happens when there is not enough blood flow to your heart muscle. Angina is a sign of coronary artery disease (CAD). CAD occurs when blood vessels that supply the heart become narrowed. Having CAD increases your risk of a heart attack. Chest pain or pressure is the most common symptom of angina.  But some people have other symptoms, like:  · Pain, pressure, or a strange feeling in the back, neck, jaw, or upper belly, or in one or both shoulders or arms. · Shortness of breath. · Nausea or vomiting. · Lightheadedness or sudden weakness. · Fast or irregular heartbeat. Women are somewhat more likely than men to have angina symptoms like shortness of breath, nausea, and back or jaw pain. Angina can be dangerous. That's why it is important to pay attention to your symptoms. Know what is typical for you, learn how to control your symptoms, and understand when you need to get treatment. A change in your usual pattern of symptoms is an emergency. It may mean that you are having a heart attack. The doctor has checked you carefully, but problems can develop later. If you notice any problems or new symptoms, get medical treatment right away. Follow-up care is a key part of your treatment and safety. Be sure to make and go to all appointments, and call your doctor if you are having problems. It's also a good idea to know your test results and keep a list of the medicines you take. How can you care for yourself at home? Medicines    · If your doctor has given you nitroglycerin for angina symptoms, keep it with you at all times. If you have symptoms, sit down and rest, and take the first dose of nitroglycerin as directed. If your symptoms get worse or are not getting better within 5 minutes, call 911 right away. Stay on the phone. The emergency  will give you further instructions.     · If your doctor advises it, take 1 low-dose aspirin a day to prevent heart attack.     · Be safe with medicines. Take your medicines exactly as prescribed. Call your doctor if you think you are having a problem with your medicine. You will get more details on the specific medicines your doctor prescribes.    Lifestyle changes    · Do not smoke. If you need help quitting, talk to your doctor about stop-smoking programs and medicines.  These can increase your chances of quitting for good.     · Eat a heart-healthy diet that is low in saturated fat and salt, and is high in fiber. Talk to your doctor or a dietitian about healthy eating.     · Stay at a healthy weight. Or lose weight if you need to. Activity    · Talk to your doctor about a level of activity that is safe for you.     · If an activity causes angina symptoms, stop and rest.   When should you call for help? Call 911 anytime you think you may need emergency care. For example, call if:    · You passed out (lost consciousness).     · You have symptoms of a heart attack. These may include:  ? Chest pain or pressure, or a strange feeling in the chest.  ? Sweating. ? Shortness of breath. ? Nausea or vomiting. ? Pain, pressure, or a strange feeling in the back, neck, jaw, or upper belly or in one or both shoulders or arms. ? Lightheadedness or sudden weakness. ? A fast or irregular heartbeat. After you call 911, the  may tell you to chew 1 adult-strength or 2 to 4 low-dose aspirin. Wait for an ambulance. Do not try to drive yourself.     · You have angina symptoms that do not go away with rest or are not getting better within 5 minutes after you take a dose of nitroglycerin.    Call your doctor now or seek immediate medical care if:    · You are having angina symptoms more often than usual, or they are different or worse than usual.     · You feel dizzy or lightheaded, or you feel like you may faint.    Watch closely for changes in your health, and be sure to contact your doctor if you have any problems. Where can you learn more? Go to http://chasity-ashely.info/. Enter H129 in the search box to learn more about \"Angina: Care Instructions. \"  Current as of: July 22, 2018  Content Version: 12.1  © 4541-3234 [employer]. Care instructions adapted under license by [employer] (which disclaims liability or warranty for this information).  If you have questions about a medical condition or this instruction, always ask your healthcare professional. [place] [address] any warranty or liability for your use of this information. DISCHARGE SUMMARY from Nurse    PATIENT INSTRUCTIONS:    After general anesthesia or intravenous sedation, for 24 hours or while taking prescription Narcotics:  · Limit your activities  · Do not drive and operate hazardous machinery  · Do not make important personal or business decisions  · Do  not drink alcoholic beverages  · If you have not urinated within 8 hours after discharge, please contact your surgeon on call. Report the following to your surgeon:  · Excessive pain, swelling, redness or odor of or around the surgical area  · Temperature over 100.5  · Nausea and vomiting lasting longer than 4 hours or if unable to take medications  · Any signs of decreased circulation or nerve impairment to extremity: change in color, persistent  numbness, tingling, coldness or increase pain  · Any questions    What to do at Home:  Recommended activity: Activity as tolerated. If you experience any of the following symptoms chest pain, difficulty breathing, changes in mental status, increased weakness, slurred speech or bleeding please call 911. *  Please give a list of your current medications to your Primary Care Provider. *  Please update this list whenever your medications are discontinued, doses are      changed, or new medications (including over-the-counter products) are added. *  Please carry medication information at all times in case of emergency situations. These are general instructions for a healthy lifestyle:    No smoking/ No tobacco products/ Avoid exposure to second hand smoke  Surgeon General's Warning:  Quitting smoking now greatly reduces serious risk to your health.     Obesity, smoking, and sedentary lifestyle greatly increases your risk for illness    A healthy diet, regular physical exercise & weight monitoring are important for maintaining a healthy lifestyle    You may be retaining fluid if you have a history of heart failure or if you experience any of the following symptoms:  Weight gain of 3 pounds or more overnight or 5 pounds in a week, increased swelling in our hands or feet or shortness of breath while lying flat in bed. Please call your doctor as soon as you notice any of these symptoms; do not wait until your next office visit. The discharge information has been reviewed with the patient and spouse. The patient and spouse verbalized understanding. Discharge medications reviewed with the patient and spouse and appropriate educational materials and side effects teaching were provided.   ___________________________________________________________________________________________________________________________________    Patient armband removed and shredded

## 2019-08-21 NOTE — PROGRESS NOTES
D/C order noted for today. Orders reviewed. No needs identified at this time. CM remains available if needed. Met with patient spouse to transport home.  Hiram Becerra RN [phone]

## 2019-08-21 NOTE — ROUTINE PROCESS
TRANSFER - IN REPORT:    Verbal report received from [address] on Wisam De Leon  being received from ED  for routine progression of care      Report consisted of patients Situation, Background, Assessment and   Recommendations(SBAR). Information from the following report(s) SBAR, Kardex and MAR was reviewed with the receiving nurse. Opportunity for questions and clarification was provided. Assessment completed upon patients arrival to unit and care assumed.

## 2019-08-21 NOTE — ED NOTES
Verbal shift change report given to Delonte VARGAS,RN, RN (oncoming nurse) by Kala Beal (offgoing nurse). Report included the following information SBAR, Kardex, ED Summary and MAR.

## 2019-08-21 NOTE — PROGRESS NOTES
Reason for Admission:   Chest pain [R07.9]               RRAT Score:    33             Resources/supports as identified by patient/family:       Top Challenges facing patient (as identified by patient/family and CM): Patient and spouse report no challenges    Finances/Medication cost?     none  Transportation      spouse  Support system or lack thereof? Lives with spouse and son  Living arrangements?        family   Self-care/ADLs/Cognition? Patient reports some assistance with bathing dressing and food prep, alert and oriented        Current Advanced Directive/Advance Care Plan:   no                          Plan for utilizing home health:    yes, FOC obtained for [facility]                      Likelihood of readmission:   HIGH    Transition of Care Plan:                    Initial assessment completed with patient. Cognitive status of patient: oriented to time, place, person and situation. Face sheet information confirmed:  yes. The patient designates spouse, Otis, to participate in her discharge plan and to receive any needed information. This patient lives in a single family home with patient, son and spouse. Patient is able to navigate steps as needed. Prior to hospitalization, patient was considered to be independent with ADLs/IADLS : no . If not independent,  patient needs assist with : dressing, bathing and cooking    Patient has a current ACP document on file: no  The patient and spouse will be available to transport patient home upon discharge. The patient already has [facility], [facility] medical equipment available in the home. Patient is not currently active with home health. Patient has not stayed in a skilled nursing facility or rehab. This patient is on dialysis :no    List of available Home Health agencies were provided and reviewed with the patient prior to discharge. [facility]  Currently, the discharge plan is Home with Home Health. The patient states that she can obtain her medications from the pharmacy, and take her medications as directed. Patient's current insurance is [employer]       Care Management Interventions  PCP Verified by CM: Yes([facility])  Mode of Transport at Discharge: Self()  Transition of Care Consult (CM Consult): [address]: Yes  Discharge Durable Medical Equipment: No  Current Support Network: Lives with Spouse  Confirm Follow Up Transport: Family  Plan discussed with Pt/Family/Caregiver: Yes  Freedom of Choice Offered: Yes  Discharge Location  Discharge Placement: Home with home health        Natalia Castillo RN BSN  Case Management [phone]

## 2019-08-21 NOTE — DIABETES MGMT
NUTRITIONAL ASSESSMENT GLYCEMIC CONTROL/ PLAN OF CARE     Matteo Buck           79 y.o.           8/20/2019                 1. Acute chest pain       INTERVENTIONS/PLAN:   Consider addition of Lantus insulin 8 units nightly     ASSESSMENT:   Pt is a 79year old female with a past medical history significant for CAD, hypertension, diabetes, and anemia who presented with chest pain. Blood glucose fluctuating above targets. Pt currently has order for correctional Lispro insulin coverage. Pt reports taking 70/30 insulin at bedtime. States she has hypoglycemia multiple times weekly at night. Encouraged pt to take her insulin at dinner meal and follow up with her PCP if continuing to have frequent lows. Pt reports having a good appetite.      Diabetes Management:   Recent blood glucose:   8/21/2019 01:00 8/21/2019 06:29 8/21/2019 07:29 8/21/2019 11:10   214 (H) 138 (H) 119 (H) 251 (H)   Within target range (non-ICU: <140; ICU<180): [] Yes   [x]  No    Current Insulin regimen:    Correctional Lispro insulin every 6 hours   Home medication/insulin regimen:   Humulin 70/30 insulin 15 units nightly   Glimepiride   HbA1c: 8.5% (estimated average glucose of 197 mg/dL)  Adequate glycemic control PTA:  [] Yes  [x] No     SUBJECTIVE/OBJECTIVE:   Information obtained from: patient, chart review     Diet: Cardiac, Consistent Carbohydrate 1800 kcal    Medications: [x]  Reviewed     Most Recent POC Glucose:   Recent Labs     08/21/19  0444 08/20/19  1840   * 217*      Labs:   Lab Results   Component Value Date/Time    Hemoglobin A1c 9.2 (H) 03/11/2019 04:15 PM     Lab Results   Component Value Date/Time    Sodium 139 08/21/2019 04:44 AM    Potassium 3.5 08/21/2019 04:44 AM    Chloride 103 08/21/2019 04:44 AM    CO2 31 08/21/2019 04:44 AM    Anion gap 5 08/21/2019 04:44 AM    Glucose 108 (H) 08/21/2019 04:44 AM    BUN 15 08/21/2019 04:44 AM    Creatinine 1.10 08/21/2019 04:44 AM    Calcium 8.9 08/21/2019 04:44 AM Magnesium 2.0 08/21/2019 04:44 AM    Phosphorus 2.8 08/21/2019 04:44 AM    Albumin 3.7 08/20/2019 06:40 PM     Anthropometrics:   BMI (calculated): 31.2  Wt Readings from Last 1 Encounters:   08/20/19 72.6 kg (160 lb)      Ht Readings from Last 1 Encounters:   08/20/19 5' (1.524 m)     Estimated Nutrition Needs:  4283-8689 Kcal/day, 58-73 grams protein/day   Based on:   [x]  Actual BW    []  IBW   []   Adjusted BW      Nutrition Diagnoses:    Altered nutrition related lab value related to diabetes as evidenced by Hemoglobin A1c of 8.5%   Nutrition Interventions: assessment of managment  Goal: Blood glucose will be within target range of  mg/dL by 8/23/19     Nutrition Monitoring and Evaluation    []     Monitor po intake on meal rounds  [x]     Continue inpatient monitoring and intervention  []     Other:    Cee Snow, [address] [place] [address], [address]

## 2019-08-22 LAB
ATRIAL RATE: 66 BPM
ATRIAL RATE: 75 BPM
CALCULATED P AXIS, ECG09: 42 DEGREES
CALCULATED P AXIS, ECG09: 72 DEGREES
CALCULATED R AXIS, ECG10: -36 DEGREES
CALCULATED R AXIS, ECG10: -39 DEGREES
CALCULATED T AXIS, ECG11: 126 DEGREES
CALCULATED T AXIS, ECG11: 148 DEGREES
DIAGNOSIS, 93000: NORMAL
DIAGNOSIS, 93000: NORMAL
P-R INTERVAL, ECG05: 188 MS
P-R INTERVAL, ECG05: 204 MS
Q-T INTERVAL, ECG07: 412 MS
Q-T INTERVAL, ECG07: 422 MS
QRS DURATION, ECG06: 88 MS
QRS DURATION, ECG06: 90 MS
QTC CALCULATION (BEZET), ECG08: 442 MS
QTC CALCULATION (BEZET), ECG08: 460 MS
VENTRICULAR RATE, ECG03: 66 BPM
VENTRICULAR RATE, ECG03: 75 BPM

## 2019-08-22 NOTE — ROUTINE PROCESS
1815 pt discharged to home with instructions given to her and daughter, opportunity given for questions, pt medications filled in outpatient pharmacy

## 2019-11-09 PROBLEM — Z95.1 S/P CABG (CORONARY ARTERY BYPASS GRAFT): Status: ACTIVE | Noted: 2019-11-09

## 2019-11-09 PROBLEM — E78.5 DYSLIPIDEMIA: Status: ACTIVE | Noted: 2019-11-09

## 2019-11-09 PROBLEM — E11.9 DM2 (DIABETES MELLITUS, TYPE 2) (HCC): Status: ACTIVE | Noted: 2019-11-09
